# Patient Record
Sex: FEMALE | Race: WHITE | Employment: FULL TIME | URBAN - METROPOLITAN AREA
[De-identification: names, ages, dates, MRNs, and addresses within clinical notes are randomized per-mention and may not be internally consistent; named-entity substitution may affect disease eponyms.]

---

## 2019-10-14 ENCOUNTER — APPOINTMENT (OUTPATIENT)
Dept: GENERAL RADIOLOGY | Age: 54
End: 2019-10-14
Attending: EMERGENCY MEDICINE
Payer: COMMERCIAL

## 2019-10-14 ENCOUNTER — HOSPITAL ENCOUNTER (OUTPATIENT)
Age: 54
Setting detail: OBSERVATION
Discharge: HOME | End: 2019-10-17
Attending: EMERGENCY MEDICINE | Admitting: ORTHOPAEDIC SURGERY
Payer: COMMERCIAL

## 2019-10-14 ENCOUNTER — APPOINTMENT (OUTPATIENT)
Dept: GENERAL RADIOLOGY | Age: 54
End: 2019-10-14
Attending: PHYSICIAN ASSISTANT
Payer: COMMERCIAL

## 2019-10-14 DIAGNOSIS — S82.851B TYPE I OR II OPEN TRIMALLEOLAR FRACTURE OF RIGHT ANKLE, INITIAL ENCOUNTER: Primary | ICD-10-CM

## 2019-10-14 DIAGNOSIS — S82.891A FRACTURE DISLOCATION OF ANKLE JOINT, RIGHT, CLOSED, INITIAL ENCOUNTER: ICD-10-CM

## 2019-10-14 LAB
ANION GAP SERPL CALC-SCNC: 6 MMOL/L (ref 5–15)
APPEARANCE UR: ABNORMAL
BACTERIA URNS QL MICRO: NEGATIVE /HPF
BILIRUB UR QL: NEGATIVE
BUN SERPL-MCNC: 14 MG/DL (ref 6–20)
BUN/CREAT SERPL: 23 (ref 12–20)
CALCIUM SERPL-MCNC: 8.4 MG/DL (ref 8.5–10.1)
CHLORIDE SERPL-SCNC: 108 MMOL/L (ref 97–108)
CO2 SERPL-SCNC: 25 MMOL/L (ref 21–32)
COLOR UR: ABNORMAL
COMMENT, HOLDF: NORMAL
CREAT SERPL-MCNC: 0.62 MG/DL (ref 0.55–1.02)
EPITH CASTS URNS QL MICRO: ABNORMAL /LPF
ERYTHROCYTE [DISTWIDTH] IN BLOOD BY AUTOMATED COUNT: 12.6 % (ref 11.5–14.5)
GLUCOSE SERPL-MCNC: 111 MG/DL (ref 65–100)
GLUCOSE UR STRIP.AUTO-MCNC: NEGATIVE MG/DL
HCT VFR BLD AUTO: 33.7 % (ref 35–47)
HGB BLD-MCNC: 11.1 G/DL (ref 11.5–16)
HGB UR QL STRIP: ABNORMAL
HYALINE CASTS URNS QL MICRO: ABNORMAL /LPF (ref 0–5)
KETONES UR QL STRIP.AUTO: 15 MG/DL
LEUKOCYTE ESTERASE UR QL STRIP.AUTO: NEGATIVE
MCH RBC QN AUTO: 30.7 PG (ref 26–34)
MCHC RBC AUTO-ENTMCNC: 32.9 G/DL (ref 30–36.5)
MCV RBC AUTO: 93.4 FL (ref 80–99)
NITRITE UR QL STRIP.AUTO: NEGATIVE
NRBC # BLD: 0 K/UL (ref 0–0.01)
NRBC BLD-RTO: 0 PER 100 WBC
PH UR STRIP: 5.5 [PH] (ref 5–8)
PLATELET # BLD AUTO: 211 K/UL (ref 150–400)
PMV BLD AUTO: 9.3 FL (ref 8.9–12.9)
POTASSIUM SERPL-SCNC: 3.5 MMOL/L (ref 3.5–5.1)
PROT UR STRIP-MCNC: ABNORMAL MG/DL
RBC # BLD AUTO: 3.61 M/UL (ref 3.8–5.2)
RBC #/AREA URNS HPF: ABNORMAL /HPF (ref 0–5)
SAMPLES BEING HELD,HOLD: NORMAL
SODIUM SERPL-SCNC: 139 MMOL/L (ref 136–145)
SP GR UR REFRACTOMETRY: 1.02 (ref 1–1.03)
UROBILINOGEN UR QL STRIP.AUTO: 0.2 EU/DL (ref 0.2–1)
WBC # BLD AUTO: 12.1 K/UL (ref 3.6–11)
WBC URNS QL MICRO: ABNORMAL /HPF (ref 0–4)

## 2019-10-14 PROCEDURE — 85027 COMPLETE CBC AUTOMATED: CPT

## 2019-10-14 PROCEDURE — 73600 X-RAY EXAM OF ANKLE: CPT

## 2019-10-14 PROCEDURE — 74011000258 HC RX REV CODE- 258: Performed by: EMERGENCY MEDICINE

## 2019-10-14 PROCEDURE — 74011250636 HC RX REV CODE- 250/636: Performed by: EMERGENCY MEDICINE

## 2019-10-14 PROCEDURE — 93005 ELECTROCARDIOGRAM TRACING: CPT

## 2019-10-14 PROCEDURE — 96374 THER/PROPH/DIAG INJ IV PUSH: CPT

## 2019-10-14 PROCEDURE — 96375 TX/PRO/DX INJ NEW DRUG ADDON: CPT

## 2019-10-14 PROCEDURE — 74011000250 HC RX REV CODE- 250

## 2019-10-14 PROCEDURE — 96365 THER/PROPH/DIAG IV INF INIT: CPT

## 2019-10-14 PROCEDURE — 99285 EMERGENCY DEPT VISIT HI MDM: CPT

## 2019-10-14 PROCEDURE — 99152 MOD SED SAME PHYS/QHP 5/>YRS: CPT

## 2019-10-14 PROCEDURE — 74011250637 HC RX REV CODE- 250/637: Performed by: PHYSICIAN ASSISTANT

## 2019-10-14 PROCEDURE — 81001 URINALYSIS AUTO W/SCOPE: CPT

## 2019-10-14 PROCEDURE — 71045 X-RAY EXAM CHEST 1 VIEW: CPT

## 2019-10-14 PROCEDURE — 75810000301 HC ER LEVEL 1 CLOSED TREATMNT FRACTURE/DISLOCATION

## 2019-10-14 PROCEDURE — 99218 HC RM OBSERVATION: CPT

## 2019-10-14 PROCEDURE — 65270000029 HC RM PRIVATE

## 2019-10-14 PROCEDURE — 36415 COLL VENOUS BLD VENIPUNCTURE: CPT

## 2019-10-14 PROCEDURE — 80048 BASIC METABOLIC PNL TOTAL CA: CPT

## 2019-10-14 PROCEDURE — 74011250636 HC RX REV CODE- 250/636: Performed by: PHYSICIAN ASSISTANT

## 2019-10-14 RX ORDER — KETAMINE HYDROCHLORIDE 100 MG/ML
1 INJECTION, SOLUTION INTRAMUSCULAR; INTRAVENOUS
Status: DISPENSED | OUTPATIENT
Start: 2019-10-14 | End: 2019-10-15

## 2019-10-14 RX ORDER — NALOXONE HYDROCHLORIDE 0.4 MG/ML
0.4 INJECTION, SOLUTION INTRAMUSCULAR; INTRAVENOUS; SUBCUTANEOUS AS NEEDED
Status: DISCONTINUED | OUTPATIENT
Start: 2019-10-14 | End: 2019-10-17 | Stop reason: HOSPADM

## 2019-10-14 RX ORDER — KETAMINE HYDROCHLORIDE 50 MG/ML
INJECTION, SOLUTION INTRAMUSCULAR; INTRAVENOUS
Status: COMPLETED
Start: 2019-10-14 | End: 2019-10-14

## 2019-10-14 RX ORDER — ACETAMINOPHEN 325 MG/1
650 TABLET ORAL EVERY 6 HOURS
Status: DISCONTINUED | OUTPATIENT
Start: 2019-10-14 | End: 2019-10-17 | Stop reason: HOSPADM

## 2019-10-14 RX ORDER — DIPHENHYDRAMINE HYDROCHLORIDE 50 MG/ML
12.5 INJECTION, SOLUTION INTRAMUSCULAR; INTRAVENOUS
Status: DISCONTINUED | OUTPATIENT
Start: 2019-10-14 | End: 2019-10-17 | Stop reason: HOSPADM

## 2019-10-14 RX ORDER — MELATONIN
1000 DAILY
COMMUNITY
End: 2022-06-27

## 2019-10-14 RX ORDER — ONDANSETRON 4 MG/1
4 TABLET, ORALLY DISINTEGRATING ORAL
Status: DISCONTINUED | OUTPATIENT
Start: 2019-10-14 | End: 2019-10-17 | Stop reason: HOSPADM

## 2019-10-14 RX ORDER — HYDROMORPHONE HYDROCHLORIDE 1 MG/ML
2 INJECTION, SOLUTION INTRAMUSCULAR; INTRAVENOUS; SUBCUTANEOUS
Status: DISCONTINUED | OUTPATIENT
Start: 2019-10-14 | End: 2019-10-17 | Stop reason: HOSPADM

## 2019-10-14 RX ORDER — DOCUSATE SODIUM 100 MG/1
100 CAPSULE, LIQUID FILLED ORAL 2 TIMES DAILY
Status: DISCONTINUED | OUTPATIENT
Start: 2019-10-14 | End: 2019-10-17 | Stop reason: HOSPADM

## 2019-10-14 RX ORDER — ESCITALOPRAM OXALATE 10 MG/1
10 TABLET ORAL DAILY
COMMUNITY
End: 2022-06-27

## 2019-10-14 RX ORDER — HYDROMORPHONE HYDROCHLORIDE 2 MG/ML
2 INJECTION, SOLUTION INTRAMUSCULAR; INTRAVENOUS; SUBCUTANEOUS ONCE
Status: COMPLETED | OUTPATIENT
Start: 2019-10-14 | End: 2019-10-14

## 2019-10-14 RX ORDER — SODIUM CHLORIDE 0.9 % (FLUSH) 0.9 %
5-40 SYRINGE (ML) INJECTION EVERY 8 HOURS
Status: DISCONTINUED | OUTPATIENT
Start: 2019-10-14 | End: 2019-10-17 | Stop reason: HOSPADM

## 2019-10-14 RX ORDER — OXYCODONE HYDROCHLORIDE 5 MG/1
5-10 TABLET ORAL
Status: DISCONTINUED | OUTPATIENT
Start: 2019-10-14 | End: 2019-10-17 | Stop reason: HOSPADM

## 2019-10-14 RX ORDER — SODIUM CHLORIDE 0.9 % (FLUSH) 0.9 %
5-40 SYRINGE (ML) INJECTION AS NEEDED
Status: DISCONTINUED | OUTPATIENT
Start: 2019-10-14 | End: 2019-10-17 | Stop reason: HOSPADM

## 2019-10-14 RX ORDER — KETAMINE HYDROCHLORIDE 50 MG/ML
INJECTION, SOLUTION INTRAMUSCULAR; INTRAVENOUS
Status: DISPENSED
Start: 2019-10-14 | End: 2019-10-15

## 2019-10-14 RX ORDER — MORPHINE SULFATE 10 MG/ML
6 INJECTION, SOLUTION INTRAMUSCULAR; INTRAVENOUS
Status: COMPLETED | OUTPATIENT
Start: 2019-10-14 | End: 2019-10-14

## 2019-10-14 RX ORDER — LEVOTHYROXINE SODIUM 50 UG/1
50 TABLET ORAL
COMMUNITY

## 2019-10-14 RX ADMIN — HYDROMORPHONE HYDROCHLORIDE 2 MG: 2 INJECTION INTRAMUSCULAR; INTRAVENOUS; SUBCUTANEOUS at 19:41

## 2019-10-14 RX ADMIN — MORPHINE SULFATE 6 MG: 10 INJECTION INTRAVENOUS at 18:43

## 2019-10-14 RX ADMIN — CEFAZOLIN 1 G: 1 INJECTION, POWDER, FOR SOLUTION INTRAMUSCULAR; INTRAVENOUS; PARENTERAL at 20:11

## 2019-10-14 RX ADMIN — ACETAMINOPHEN 650 MG: 325 TABLET, FILM COATED ORAL at 22:05

## 2019-10-14 RX ADMIN — DOCUSATE SODIUM 100 MG: 100 CAPSULE, LIQUID FILLED ORAL at 22:04

## 2019-10-14 RX ADMIN — KETAMINE HYDROCHLORIDE 70 MG: 50 INJECTION INTRAMUSCULAR; INTRAVENOUS at 19:16

## 2019-10-14 RX ADMIN — OXYCODONE HYDROCHLORIDE 5 MG: 5 TABLET ORAL at 22:05

## 2019-10-14 NOTE — ED PROVIDER NOTES
;was walking the dog/ he took off/ I slipped/ looks like I dislocated my ankle'; 'lots of pain'; No loc/ blood thinners      pt denies HA, vison changes, diff swallowing, CP, SOB, Abd pain, F/Ch, N/V, D/Cons or other current systemic complaints    No smoking/ social etoh/ no drugs             Past Medical History:   Diagnosis Date    Hypothyroidism        History reviewed. No pertinent surgical history. History reviewed. No pertinent family history. Social History     Socioeconomic History    Marital status:      Spouse name: Not on file    Number of children: Not on file    Years of education: Not on file    Highest education level: Not on file   Occupational History    Not on file   Social Needs    Financial resource strain: Not on file    Food insecurity:     Worry: Not on file     Inability: Not on file    Transportation needs:     Medical: Not on file     Non-medical: Not on file   Tobacco Use    Smoking status: Never Smoker    Smokeless tobacco: Never Used   Substance and Sexual Activity    Alcohol use: Yes     Comment: rarely    Drug use: Not on file    Sexual activity: Not on file   Lifestyle    Physical activity:     Days per week: Not on file     Minutes per session: Not on file    Stress: Not on file   Relationships    Social connections:     Talks on phone: Not on file     Gets together: Not on file     Attends Rastafarian service: Not on file     Active member of club or organization: Not on file     Attends meetings of clubs or organizations: Not on file     Relationship status: Not on file    Intimate partner violence:     Fear of current or ex partner: Not on file     Emotionally abused: Not on file     Physically abused: Not on file     Forced sexual activity: Not on file   Other Topics Concern    Not on file   Social History Narrative    Not on file         ALLERGIES: Patient has no known allergies.     Review of Systems   Musculoskeletal: Positive for arthralgias, gait problem, joint swelling and myalgias. All other systems reviewed and are negative. Vitals:    10/15/19 2046 10/15/19 2134 10/15/19 2226 10/15/19 2329   BP: 110/67 105/61 107/65 113/59   Pulse: 94 93 93 89   Resp: 14 14 14 14   Temp: 99.2 °F (37.3 °C) 97.6 °F (36.4 °C) 97.9 °F (36.6 °C) 98.4 °F (36.9 °C)   SpO2: 98% 95% 96% 95%   Weight:       Height:                Physical Exam   Constitutional: She is oriented to person, place, and time. She appears well-developed and well-nourished. Uncomfortable appearing, AxOx4, speaking in complete sentences    R ankle noted deformity/ in a pillow splint; pt has distal motor/ CV/ Sensation grossly intact / foot is cyanotic;    HENT:   Head: Normocephalic and atraumatic. Right Ear: External ear normal.   Left Ear: External ear normal.   Nose: Nose normal.   Mouth/Throat: Oropharynx is clear and moist.   Eyes: Pupils are equal, round, and reactive to light. Conjunctivae and EOM are normal. Right eye exhibits no discharge. Left eye exhibits no discharge. No scleral icterus. Neck: Normal range of motion. Neck supple. No JVD present. No tracheal deviation present. Cardiovascular: Normal rate, regular rhythm, normal heart sounds and intact distal pulses. Exam reveals no gallop and no friction rub. No murmur heard. Pulmonary/Chest: Effort normal and breath sounds normal. No respiratory distress. She has no wheezes. She has no rales. She exhibits no tenderness. Abdominal: Soft. Bowel sounds are normal. There is no tenderness. There is no rebound and no guarding. nttp   Genitourinary: No vaginal discharge found. Musculoskeletal: She exhibits tenderness and deformity. She exhibits no edema. Deformity as pictured/ ? Open fracture/ pictured in tiger text to orthopaedist/ / pt has distal motor/ CV/ Sensation grossly intact    Neurological: She is alert and oriented to person, place, and time. She displays normal reflexes.  No cranial nerve deficit or sensory deficit. She exhibits normal muscle tone. Coordination normal.   Skin: Skin is warm and dry. Capillary refill takes 2 to 3 seconds. No rash noted. No erythema. No pallor. Psychiatric: She has a normal mood and affect. Her behavior is normal. Thought content normal.   Nursing note and vitals reviewed. MDM       Procedural Sedation  Date/Time: 10/14/2019 7:37 PM  Performed by: Aron Nelson MD  Authorized by: Aron Nelson MD     Consent:     Consent obtained:  Verbal and written    Consent given by:  Patient    Risks discussed:   Allergic reaction, dysrhythmia, inadequate sedation, nausea, respiratory compromise necessitating ventilatory assistance and intubation, prolonged sedation necessitating reversal, prolonged hypoxia resulting in organ damage and vomiting    Alternatives discussed:  Analgesia without sedation  Indications:     Procedure performed:  Fracture reduction    Procedure necessitating sedation performed by:  Physician performing sedation    Intended level of sedation:  Moderate (conscious sedation)  Pre-sedation assessment:     Time since last food or drink:  6 hours    ASA classification: class 1 - normal, healthy patient      Neck mobility: normal      Mouth opening:  3 or more finger widths    Thyromental distance:  4 finger widths    Mallampati score:  II - soft palate, uvula, fauces visible    Pre-sedation assessments completed and reviewed: airway patency, cardiovascular function, hydration status, mental status, nausea/vomiting, pain level, respiratory function and temperature      History of difficult intubation: no      Pre-sedation assessment completed:  10/14/2019 6:38 PM  Immediate pre-procedure details:     Reassessment: Patient reassessed immediately prior to procedure      Reviewed: vital signs, relevant labs/tests and NPO status      Verified: bag valve mask available, emergency equipment available, intubation equipment available, IV patency confirmed, oxygen available and suction available    Procedure details (see MAR for exact dosages):     Sedation start time:  10/14/2019 7:20 PM    Preoxygenation:  Nasal cannula    Sedation:  Ketamine    Analgesia:  Morphine    Intra-procedure monitoring:  Blood pressure monitoring, cardiac monitor, continuous pulse oximetry, continuous capnometry, frequent LOC assessments and frequent vital sign checks    Intra-procedure events: none      Sedation end time:  10/14/2019 7:35 PM    Total sedation time (minutes):  15  Post-procedure details:     Post-sedation assessment completed:  10/14/2019 7:39 PM    Attendance: Constant attendance by certified staff until patient recovered      Recovery: Patient returned to pre-procedure baseline      Estimated blood loss (see I/O flowsheets): yes      Post-sedation assessments completed and reviewed: airway patency, cardiovascular function, hydration status, mental status, nausea/vomiting, pain level, respiratory function and temperature      Specimens recovered:  None    Patient is stable for discharge or admission: yes      Patient tolerance: Tolerated well, no immediate complications        CONSULT  NOTE  6:43 PM  Aimee Page MD spoke via tigertext  with Dr Romero/ Betty Hdz. Specialty: Orthopaedics  Discussed pt's hx, disposition, and available diagnostic and imaging results. Reviewed care plans. Consulting physician agrees with plans as outlined 'Will see shortly';  .    Written by Caridad Mccarty MD, ED Scribe as dictated by Nanda Dale MD.    9 PM   Ortho will admit

## 2019-10-14 NOTE — ED TRIAGE NOTES
Triage: Patient arrives via EMS with c/o a GLF that occurred this evening while walking her dog. Patient states she heard something \"pop\" in her right ankle. Patient denies hitting her head. Patient was given 100 mcg of fentanyl on route.

## 2019-10-15 ENCOUNTER — ANESTHESIA EVENT (OUTPATIENT)
Dept: SURGERY | Age: 54
End: 2019-10-15
Payer: COMMERCIAL

## 2019-10-15 ENCOUNTER — ANESTHESIA (OUTPATIENT)
Dept: SURGERY | Age: 54
End: 2019-10-15
Payer: COMMERCIAL

## 2019-10-15 ENCOUNTER — APPOINTMENT (OUTPATIENT)
Dept: GENERAL RADIOLOGY | Age: 54
End: 2019-10-15
Attending: ORTHOPAEDIC SURGERY
Payer: COMMERCIAL

## 2019-10-15 LAB
ATRIAL RATE: 92 BPM
CALCULATED P AXIS, ECG09: 64 DEGREES
CALCULATED R AXIS, ECG10: 70 DEGREES
CALCULATED T AXIS, ECG11: 36 DEGREES
DIAGNOSIS, 93000: NORMAL
P-R INTERVAL, ECG05: 130 MS
Q-T INTERVAL, ECG07: 362 MS
QRS DURATION, ECG06: 86 MS
QTC CALCULATION (BEZET), ECG08: 447 MS
VENTRICULAR RATE, ECG03: 92 BPM

## 2019-10-15 PROCEDURE — 74011000250 HC RX REV CODE- 250: Performed by: NURSE ANESTHETIST, CERTIFIED REGISTERED

## 2019-10-15 PROCEDURE — C1713 ANCHOR/SCREW BN/BN,TIS/BN: HCPCS | Performed by: ORTHOPAEDIC SURGERY

## 2019-10-15 PROCEDURE — 99218 HC RM OBSERVATION: CPT

## 2019-10-15 PROCEDURE — 74011250636 HC RX REV CODE- 250/636: Performed by: NURSE ANESTHETIST, CERTIFIED REGISTERED

## 2019-10-15 PROCEDURE — 74011250636 HC RX REV CODE- 250/636: Performed by: PHYSICIAN ASSISTANT

## 2019-10-15 PROCEDURE — 74011250637 HC RX REV CODE- 250/637: Performed by: PHYSICIAN ASSISTANT

## 2019-10-15 PROCEDURE — 76010000153 HC OR TIME 1.5 TO 2 HR: Performed by: ORTHOPAEDIC SURGERY

## 2019-10-15 PROCEDURE — 65270000029 HC RM PRIVATE

## 2019-10-15 PROCEDURE — 74011250636 HC RX REV CODE- 250/636: Performed by: ORTHOPAEDIC SURGERY

## 2019-10-15 PROCEDURE — 73610 X-RAY EXAM OF ANKLE: CPT

## 2019-10-15 PROCEDURE — 74011000258 HC RX REV CODE- 258: Performed by: PHYSICIAN ASSISTANT

## 2019-10-15 PROCEDURE — 77030000032 HC CUF TRNQT ZIMM -B: Performed by: ORTHOPAEDIC SURGERY

## 2019-10-15 PROCEDURE — 77030039497 HC CST PAD STERILE CHCS -A: Performed by: ORTHOPAEDIC SURGERY

## 2019-10-15 PROCEDURE — 77030031139 HC SUT VCRL2 J&J -A: Performed by: ORTHOPAEDIC SURGERY

## 2019-10-15 PROCEDURE — 74011250636 HC RX REV CODE- 250/636

## 2019-10-15 PROCEDURE — 76210000017 HC OR PH I REC 1.5 TO 2 HR: Performed by: ORTHOPAEDIC SURGERY

## 2019-10-15 PROCEDURE — 96376 TX/PRO/DX INJ SAME DRUG ADON: CPT

## 2019-10-15 PROCEDURE — 77030003862 HC BIT DRL SYNT -B: Performed by: ORTHOPAEDIC SURGERY

## 2019-10-15 PROCEDURE — 76060000034 HC ANESTHESIA 1.5 TO 2 HR: Performed by: ORTHOPAEDIC SURGERY

## 2019-10-15 PROCEDURE — 77030018836 HC SOL IRR NACL ICUM -A: Performed by: ORTHOPAEDIC SURGERY

## 2019-10-15 PROCEDURE — 74011250636 HC RX REV CODE- 250/636: Performed by: ANESTHESIOLOGY

## 2019-10-15 PROCEDURE — 77030002916 HC SUT ETHLN J&J -A: Performed by: ORTHOPAEDIC SURGERY

## 2019-10-15 PROCEDURE — 77030011640 HC PAD GRND REM COVD -A: Performed by: ORTHOPAEDIC SURGERY

## 2019-10-15 DEVICE — IMPLANTABLE DEVICE: Type: IMPLANTABLE DEVICE | Site: ANKLE | Status: FUNCTIONAL

## 2019-10-15 DEVICE — SCREW BNE L20MM DIA3.5MM CORT S STL ST NONCANNULATED LOK: Type: IMPLANTABLE DEVICE | Site: ANKLE | Status: FUNCTIONAL

## 2019-10-15 DEVICE — 3.5MM CORTEX SCREW SELF-TAPPING 14MM: Type: IMPLANTABLE DEVICE | Site: ANKLE | Status: FUNCTIONAL

## 2019-10-15 DEVICE — SCREW BNE L38MM DIA3.5MM CORT S STL ST LOK FULL THRD: Type: IMPLANTABLE DEVICE | Site: ANKLE | Status: FUNCTIONAL

## 2019-10-15 DEVICE — PLATE BNE L93MM 8 H S STL 1/3 TBLR LOK COMPR W/ CLLR FOR: Type: IMPLANTABLE DEVICE | Site: ANKLE | Status: FUNCTIONAL

## 2019-10-15 DEVICE — SCREW BNE L16MM DIA4MM CANC S STL ST CANN NONLOCKING FULL: Type: IMPLANTABLE DEVICE | Site: ANKLE | Status: FUNCTIONAL

## 2019-10-15 DEVICE — 3.5MM CORTEX SCREW SELF-TAPPING 12MM: Type: IMPLANTABLE DEVICE | Site: ANKLE | Status: FUNCTIONAL

## 2019-10-15 DEVICE — ISOLA SPINE SYSTEM ROD BENDERS (TUBULAR) SET
Type: IMPLANTABLE DEVICE | Site: ANKLE | Status: FUNCTIONAL
Brand: ISOLA

## 2019-10-15 RX ORDER — ROPIVACAINE HYDROCHLORIDE 5 MG/ML
150 INJECTION, SOLUTION EPIDURAL; INFILTRATION; PERINEURAL AS NEEDED
Status: DISCONTINUED | OUTPATIENT
Start: 2019-10-15 | End: 2019-10-15 | Stop reason: HOSPADM

## 2019-10-15 RX ORDER — NALOXONE HYDROCHLORIDE 0.4 MG/ML
0.4 INJECTION, SOLUTION INTRAMUSCULAR; INTRAVENOUS; SUBCUTANEOUS AS NEEDED
Status: DISCONTINUED | OUTPATIENT
Start: 2019-10-15 | End: 2019-10-17 | Stop reason: HOSPADM

## 2019-10-15 RX ORDER — PHENYLEPHRINE HCL IN 0.9% NACL 0.4MG/10ML
SYRINGE (ML) INTRAVENOUS AS NEEDED
Status: DISCONTINUED | OUTPATIENT
Start: 2019-10-15 | End: 2019-10-15 | Stop reason: HOSPADM

## 2019-10-15 RX ORDER — MIDAZOLAM HYDROCHLORIDE 1 MG/ML
1 INJECTION, SOLUTION INTRAMUSCULAR; INTRAVENOUS AS NEEDED
Status: DISCONTINUED | OUTPATIENT
Start: 2019-10-15 | End: 2019-10-15 | Stop reason: HOSPADM

## 2019-10-15 RX ORDER — FENTANYL CITRATE 50 UG/ML
25 INJECTION, SOLUTION INTRAMUSCULAR; INTRAVENOUS
Status: DISCONTINUED | OUTPATIENT
Start: 2019-10-15 | End: 2019-10-15 | Stop reason: HOSPADM

## 2019-10-15 RX ORDER — ACETAMINOPHEN 325 MG/1
650 TABLET ORAL
Status: DISCONTINUED | OUTPATIENT
Start: 2019-10-15 | End: 2019-10-17 | Stop reason: HOSPADM

## 2019-10-15 RX ORDER — HYDROMORPHONE HYDROCHLORIDE 1 MG/ML
0.2 INJECTION, SOLUTION INTRAMUSCULAR; INTRAVENOUS; SUBCUTANEOUS
Status: COMPLETED | OUTPATIENT
Start: 2019-10-15 | End: 2019-10-15

## 2019-10-15 RX ORDER — SODIUM CHLORIDE, SODIUM LACTATE, POTASSIUM CHLORIDE, CALCIUM CHLORIDE 600; 310; 30; 20 MG/100ML; MG/100ML; MG/100ML; MG/100ML
1000 INJECTION, SOLUTION INTRAVENOUS CONTINUOUS
Status: DISCONTINUED | OUTPATIENT
Start: 2019-10-15 | End: 2019-10-15 | Stop reason: HOSPADM

## 2019-10-15 RX ORDER — MORPHINE SULFATE 10 MG/ML
2 INJECTION, SOLUTION INTRAMUSCULAR; INTRAVENOUS
Status: DISCONTINUED | OUTPATIENT
Start: 2019-10-15 | End: 2019-10-15 | Stop reason: HOSPADM

## 2019-10-15 RX ORDER — SODIUM CHLORIDE, SODIUM LACTATE, POTASSIUM CHLORIDE, CALCIUM CHLORIDE 600; 310; 30; 20 MG/100ML; MG/100ML; MG/100ML; MG/100ML
100 INJECTION, SOLUTION INTRAVENOUS CONTINUOUS
Status: DISCONTINUED | OUTPATIENT
Start: 2019-10-15 | End: 2019-10-15 | Stop reason: HOSPADM

## 2019-10-15 RX ORDER — LIDOCAINE HYDROCHLORIDE 20 MG/ML
INJECTION, SOLUTION EPIDURAL; INFILTRATION; INTRACAUDAL; PERINEURAL AS NEEDED
Status: DISCONTINUED | OUTPATIENT
Start: 2019-10-15 | End: 2019-10-15 | Stop reason: HOSPADM

## 2019-10-15 RX ORDER — CEFAZOLIN SODIUM/WATER 2 G/20 ML
2 SYRINGE (ML) INTRAVENOUS ONCE
Status: COMPLETED | OUTPATIENT
Start: 2019-10-15 | End: 2019-10-15

## 2019-10-15 RX ORDER — CEFAZOLIN SODIUM/WATER 2 G/20 ML
2 SYRINGE (ML) INTRAVENOUS EVERY 8 HOURS
Status: COMPLETED | OUTPATIENT
Start: 2019-10-16 | End: 2019-10-16

## 2019-10-15 RX ORDER — MIDAZOLAM HYDROCHLORIDE 1 MG/ML
INJECTION, SOLUTION INTRAMUSCULAR; INTRAVENOUS AS NEEDED
Status: DISCONTINUED | OUTPATIENT
Start: 2019-10-15 | End: 2019-10-15 | Stop reason: HOSPADM

## 2019-10-15 RX ORDER — KETAMINE HYDROCHLORIDE 10 MG/ML
INJECTION, SOLUTION INTRAMUSCULAR; INTRAVENOUS AS NEEDED
Status: DISCONTINUED | OUTPATIENT
Start: 2019-10-15 | End: 2019-10-15 | Stop reason: HOSPADM

## 2019-10-15 RX ORDER — HYDROMORPHONE HYDROCHLORIDE 1 MG/ML
INJECTION, SOLUTION INTRAMUSCULAR; INTRAVENOUS; SUBCUTANEOUS
Status: COMPLETED
Start: 2019-10-15 | End: 2019-10-15

## 2019-10-15 RX ORDER — OXYCODONE AND ACETAMINOPHEN 5; 325 MG/1; MG/1
1 TABLET ORAL
Status: DISCONTINUED | OUTPATIENT
Start: 2019-10-15 | End: 2019-10-17 | Stop reason: HOSPADM

## 2019-10-15 RX ORDER — DEXAMETHASONE SODIUM PHOSPHATE 4 MG/ML
INJECTION, SOLUTION INTRA-ARTICULAR; INTRALESIONAL; INTRAMUSCULAR; INTRAVENOUS; SOFT TISSUE AS NEEDED
Status: DISCONTINUED | OUTPATIENT
Start: 2019-10-15 | End: 2019-10-15 | Stop reason: HOSPADM

## 2019-10-15 RX ORDER — ONDANSETRON 2 MG/ML
INJECTION INTRAMUSCULAR; INTRAVENOUS AS NEEDED
Status: DISCONTINUED | OUTPATIENT
Start: 2019-10-15 | End: 2019-10-15 | Stop reason: HOSPADM

## 2019-10-15 RX ORDER — FENTANYL CITRATE 50 UG/ML
INJECTION, SOLUTION INTRAMUSCULAR; INTRAVENOUS AS NEEDED
Status: DISCONTINUED | OUTPATIENT
Start: 2019-10-15 | End: 2019-10-15 | Stop reason: HOSPADM

## 2019-10-15 RX ORDER — SODIUM CHLORIDE 0.9 % (FLUSH) 0.9 %
5-40 SYRINGE (ML) INJECTION EVERY 8 HOURS
Status: DISCONTINUED | OUTPATIENT
Start: 2019-10-15 | End: 2019-10-17 | Stop reason: HOSPADM

## 2019-10-15 RX ORDER — SODIUM CHLORIDE 0.9 % (FLUSH) 0.9 %
5-40 SYRINGE (ML) INJECTION AS NEEDED
Status: DISCONTINUED | OUTPATIENT
Start: 2019-10-15 | End: 2019-10-17 | Stop reason: HOSPADM

## 2019-10-15 RX ORDER — EPHEDRINE SULFATE/0.9% NACL/PF 50 MG/5 ML
5 SYRINGE (ML) INTRAVENOUS AS NEEDED
Status: DISCONTINUED | OUTPATIENT
Start: 2019-10-15 | End: 2019-10-15 | Stop reason: HOSPADM

## 2019-10-15 RX ORDER — ONDANSETRON 2 MG/ML
4 INJECTION INTRAMUSCULAR; INTRAVENOUS AS NEEDED
Status: DISCONTINUED | OUTPATIENT
Start: 2019-10-15 | End: 2019-10-15 | Stop reason: HOSPADM

## 2019-10-15 RX ORDER — FENTANYL CITRATE 50 UG/ML
50 INJECTION, SOLUTION INTRAMUSCULAR; INTRAVENOUS AS NEEDED
Status: DISCONTINUED | OUTPATIENT
Start: 2019-10-15 | End: 2019-10-15 | Stop reason: HOSPADM

## 2019-10-15 RX ORDER — SODIUM CHLORIDE 9 MG/ML
25 INJECTION, SOLUTION INTRAVENOUS CONTINUOUS
Status: DISCONTINUED | OUTPATIENT
Start: 2019-10-15 | End: 2019-10-15 | Stop reason: HOSPADM

## 2019-10-15 RX ORDER — LIDOCAINE HYDROCHLORIDE 10 MG/ML
0.1 INJECTION, SOLUTION EPIDURAL; INFILTRATION; INTRACAUDAL; PERINEURAL AS NEEDED
Status: DISCONTINUED | OUTPATIENT
Start: 2019-10-15 | End: 2019-10-15 | Stop reason: HOSPADM

## 2019-10-15 RX ORDER — ACETAMINOPHEN 325 MG/1
650 TABLET ORAL ONCE
Status: DISCONTINUED | OUTPATIENT
Start: 2019-10-15 | End: 2019-10-15 | Stop reason: HOSPADM

## 2019-10-15 RX ORDER — HYDROMORPHONE HYDROCHLORIDE 2 MG/ML
INJECTION, SOLUTION INTRAMUSCULAR; INTRAVENOUS; SUBCUTANEOUS AS NEEDED
Status: DISCONTINUED | OUTPATIENT
Start: 2019-10-15 | End: 2019-10-15 | Stop reason: HOSPADM

## 2019-10-15 RX ORDER — ASPIRIN 325 MG
325 TABLET ORAL 2 TIMES DAILY
Status: DISCONTINUED | OUTPATIENT
Start: 2019-10-16 | End: 2019-10-17 | Stop reason: HOSPADM

## 2019-10-15 RX ORDER — DIPHENHYDRAMINE HYDROCHLORIDE 50 MG/ML
12.5 INJECTION, SOLUTION INTRAMUSCULAR; INTRAVENOUS AS NEEDED
Status: DISCONTINUED | OUTPATIENT
Start: 2019-10-15 | End: 2019-10-15 | Stop reason: HOSPADM

## 2019-10-15 RX ORDER — HYDROMORPHONE HYDROCHLORIDE 1 MG/ML
1 INJECTION, SOLUTION INTRAMUSCULAR; INTRAVENOUS; SUBCUTANEOUS
Status: DISCONTINUED | OUTPATIENT
Start: 2019-10-15 | End: 2019-10-17 | Stop reason: HOSPADM

## 2019-10-15 RX ORDER — MIDAZOLAM HYDROCHLORIDE 1 MG/ML
0.5 INJECTION, SOLUTION INTRAMUSCULAR; INTRAVENOUS
Status: DISCONTINUED | OUTPATIENT
Start: 2019-10-15 | End: 2019-10-15 | Stop reason: HOSPADM

## 2019-10-15 RX ORDER — OXYCODONE HYDROCHLORIDE 5 MG/1
5 TABLET ORAL AS NEEDED
Status: DISCONTINUED | OUTPATIENT
Start: 2019-10-15 | End: 2019-10-15 | Stop reason: HOSPADM

## 2019-10-15 RX ORDER — PROPOFOL 10 MG/ML
INJECTION, EMULSION INTRAVENOUS AS NEEDED
Status: DISCONTINUED | OUTPATIENT
Start: 2019-10-15 | End: 2019-10-15 | Stop reason: HOSPADM

## 2019-10-15 RX ORDER — DEXTROSE MONOHYDRATE AND SODIUM CHLORIDE 5; .45 G/100ML; G/100ML
100 INJECTION, SOLUTION INTRAVENOUS CONTINUOUS
Status: DISCONTINUED | OUTPATIENT
Start: 2019-10-15 | End: 2019-10-17 | Stop reason: HOSPADM

## 2019-10-15 RX ORDER — SODIUM CHLORIDE, SODIUM LACTATE, POTASSIUM CHLORIDE, CALCIUM CHLORIDE 600; 310; 30; 20 MG/100ML; MG/100ML; MG/100ML; MG/100ML
INJECTION, SOLUTION INTRAVENOUS
Status: DISCONTINUED | OUTPATIENT
Start: 2019-10-15 | End: 2019-10-15 | Stop reason: HOSPADM

## 2019-10-15 RX ADMIN — DEXTROSE MONOHYDRATE AND SODIUM CHLORIDE 100 ML/HR: 5; .45 INJECTION, SOLUTION INTRAVENOUS at 11:27

## 2019-10-15 RX ADMIN — ACETAMINOPHEN 650 MG: 325 TABLET, FILM COATED ORAL at 07:23

## 2019-10-15 RX ADMIN — PROPOFOL 150 MG: 10 INJECTION, EMULSION INTRAVENOUS at 17:19

## 2019-10-15 RX ADMIN — DEXAMETHASONE SODIUM PHOSPHATE 8 MG: 4 INJECTION, SOLUTION INTRAMUSCULAR; INTRAVENOUS at 17:30

## 2019-10-15 RX ADMIN — HYDROMORPHONE HYDROCHLORIDE 0.5 MG: 2 INJECTION, SOLUTION INTRAMUSCULAR; INTRAVENOUS; SUBCUTANEOUS at 17:40

## 2019-10-15 RX ADMIN — Medication 60 MCG: at 17:43

## 2019-10-15 RX ADMIN — MORPHINE SULFATE 2 MG: 10 INJECTION INTRAVENOUS at 19:58

## 2019-10-15 RX ADMIN — MIDAZOLAM 2 MG: 1 INJECTION INTRAMUSCULAR; INTRAVENOUS at 17:13

## 2019-10-15 RX ADMIN — ONDANSETRON 4 MG: 4 TABLET, ORALLY DISINTEGRATING ORAL at 00:37

## 2019-10-15 RX ADMIN — MORPHINE SULFATE 2 MG: 10 INJECTION INTRAVENOUS at 19:50

## 2019-10-15 RX ADMIN — OXYCODONE HYDROCHLORIDE 10 MG: 5 TABLET ORAL at 15:39

## 2019-10-15 RX ADMIN — LIDOCAINE HYDROCHLORIDE 80 MG: 20 INJECTION, SOLUTION EPIDURAL; INFILTRATION; INTRACAUDAL; PERINEURAL at 17:19

## 2019-10-15 RX ADMIN — OXYCODONE HYDROCHLORIDE 10 MG: 5 TABLET ORAL at 11:16

## 2019-10-15 RX ADMIN — HYDROMORPHONE HYDROCHLORIDE 0.2 MG: 1 INJECTION, SOLUTION INTRAMUSCULAR; INTRAVENOUS; SUBCUTANEOUS at 19:22

## 2019-10-15 RX ADMIN — HYDROMORPHONE HYDROCHLORIDE 2 MG: 1 INJECTION, SOLUTION INTRAMUSCULAR; INTRAVENOUS; SUBCUTANEOUS at 12:25

## 2019-10-15 RX ADMIN — HYDROMORPHONE HYDROCHLORIDE 1 MG: 1 INJECTION, SOLUTION INTRAMUSCULAR; INTRAVENOUS; SUBCUTANEOUS at 23:34

## 2019-10-15 RX ADMIN — ONDANSETRON HYDROCHLORIDE 4 MG: 2 INJECTION, SOLUTION INTRAMUSCULAR; INTRAVENOUS at 17:30

## 2019-10-15 RX ADMIN — HYDROMORPHONE HYDROCHLORIDE 0.2 MG: 1 INJECTION, SOLUTION INTRAMUSCULAR; INTRAVENOUS; SUBCUTANEOUS at 19:05

## 2019-10-15 RX ADMIN — Medication 2 G: at 17:28

## 2019-10-15 RX ADMIN — HYDROMORPHONE HYDROCHLORIDE 2 MG: 1 INJECTION, SOLUTION INTRAMUSCULAR; INTRAVENOUS; SUBCUTANEOUS at 05:02

## 2019-10-15 RX ADMIN — SODIUM CHLORIDE, POTASSIUM CHLORIDE, SODIUM LACTATE AND CALCIUM CHLORIDE: 600; 310; 30; 20 INJECTION, SOLUTION INTRAVENOUS at 17:13

## 2019-10-15 RX ADMIN — ACETAMINOPHEN 650 MG: 325 TABLET, FILM COATED ORAL at 20:59

## 2019-10-15 RX ADMIN — SODIUM CHLORIDE, SODIUM LACTATE, POTASSIUM CHLORIDE, AND CALCIUM CHLORIDE 1000 ML: 600; 310; 30; 20 INJECTION, SOLUTION INTRAVENOUS at 16:55

## 2019-10-15 RX ADMIN — KETAMINE HYDROCHLORIDE 20 MG: 10 INJECTION, SOLUTION INTRAMUSCULAR; INTRAVENOUS at 17:24

## 2019-10-15 RX ADMIN — HYDROMORPHONE HYDROCHLORIDE 0.2 MG: 1 INJECTION, SOLUTION INTRAMUSCULAR; INTRAVENOUS; SUBCUTANEOUS at 19:33

## 2019-10-15 RX ADMIN — HYDROMORPHONE HYDROCHLORIDE 2 MG: 1 INJECTION, SOLUTION INTRAMUSCULAR; INTRAVENOUS; SUBCUTANEOUS at 00:35

## 2019-10-15 RX ADMIN — ONDANSETRON 4 MG: 4 TABLET, ORALLY DISINTEGRATING ORAL at 07:53

## 2019-10-15 RX ADMIN — HYDROMORPHONE HYDROCHLORIDE 0.2 MG: 1 INJECTION, SOLUTION INTRAMUSCULAR; INTRAVENOUS; SUBCUTANEOUS at 19:43

## 2019-10-15 RX ADMIN — FENTANYL CITRATE 50 MCG: 50 INJECTION, SOLUTION INTRAMUSCULAR; INTRAVENOUS at 18:57

## 2019-10-15 RX ADMIN — HYDROMORPHONE HYDROCHLORIDE 2 MG: 1 INJECTION, SOLUTION INTRAMUSCULAR; INTRAVENOUS; SUBCUTANEOUS at 08:46

## 2019-10-15 RX ADMIN — ACETAMINOPHEN 650 MG: 325 TABLET, FILM COATED ORAL at 15:39

## 2019-10-15 RX ADMIN — HYDROMORPHONE HYDROCHLORIDE 0.2 MG: 1 INJECTION, SOLUTION INTRAMUSCULAR; INTRAVENOUS; SUBCUTANEOUS at 19:11

## 2019-10-15 RX ADMIN — Medication 10 ML: at 05:04

## 2019-10-15 RX ADMIN — OXYCODONE HYDROCHLORIDE 10 MG: 5 TABLET ORAL at 07:20

## 2019-10-15 RX ADMIN — DOCUSATE SODIUM 100 MG: 100 CAPSULE, LIQUID FILLED ORAL at 08:46

## 2019-10-15 RX ADMIN — OXYCODONE HYDROCHLORIDE 5 MG: 5 TABLET ORAL at 03:10

## 2019-10-15 RX ADMIN — Medication 5 ML: at 23:34

## 2019-10-15 RX ADMIN — DEXTROSE MONOHYDRATE AND SODIUM CHLORIDE 100 ML/HR: 5; .45 INJECTION, SOLUTION INTRAVENOUS at 19:55

## 2019-10-15 NOTE — PROGRESS NOTES
Admission Medication Reconciliation:    Information obtained from:  Patient  RxQuery data available¹:  NO-not at this time    Comments/Recommendations: Updated PTA meds/reviewed patient's allergies. Patient provided history. Medication changes (never reviewed): Added all agents--she is not sure of estrogen dose but will let us know tomorrow (son can provide). Also receives hormone injections. Thank you for allowing me to participate in the care of your patient. Ccoo Hairston PharmD, RN #[id number] [facility] benefit data reflects medications filled and processed through the patient's insurance, however   this data does NOT capture whether the medication was picked up or is currently being taken by the patient. Allergies:  Patient has no known allergies. Significant PMH/Disease States:   Past Medical History:   Diagnosis Date    Hypothyroidism      Chief Complaint for this Admission:    Chief Complaint   Patient presents with    Fall     Prior to Admission Medications:   Prior to Admission Medications   Prescriptions Last Dose Informant Patient Reported? Taking? cholecalciferol (VITAMIN D3) (1000 Units /25 mcg) tablet 10/14/2019 at Unknown time  Yes Yes   Sig: Take 1,000 Units by mouth daily. escitalopram oxalate (LEXAPRO) 10 mg tablet 10/14/2019 at Unknown time  Yes Yes   Sig: Take 10 mg by mouth daily. estrogens, conjugated (CONJUGATED ESTROGENS PO) 10/14/2019 at Unknown time  Yes Yes   Sig: Take  by mouth daily. levothyroxine (SYNTHROID) 50 mcg tablet 10/14/2019 at Unknown time  Yes Yes   Sig: Take 50 mcg by mouth Daily (before breakfast). Facility-Administered Medications: None       Please contact the main inpatient pharmacy with any questions or concerns at [phone] and we will direct you to the clinical pharmacist covering this patient's care while in-house.    JASEN Heredia

## 2019-10-15 NOTE — PROGRESS NOTES
TRANSFER - OUT REPORT:    Verbal report given to ACRLOTTA Love(name) on Camron Negrete  being transferred to [facility](unit) for ordered procedure       Report consisted of patients Situation, Background, Assessment and   Recommendations(SBAR). Information from the following report(s) Intake/Output, MAR and Accordion was reviewed with the receiving nurse. Lines:   Peripheral IV 10/14/19 Right Antecubital (Active)   Site Assessment Clean, dry, & intact 10/15/2019  8:47 AM   Phlebitis Assessment 0 10/15/2019  8:47 AM   Infiltration Assessment 0 10/15/2019  8:47 AM   Dressing Status Clean, dry, & intact 10/15/2019  8:47 AM   Dressing Type Transparent 10/15/2019  8:47 AM   Hub Color/Line Status Capped 10/15/2019  8:47 AM   Action Taken Open ports on tubing capped 10/15/2019  8:47 AM   Alcohol Cap Used Yes 10/15/2019  8:47 AM        Opportunity for questions and clarification was provided.       Patient transported with:

## 2019-10-15 NOTE — PROGRESS NOTES
Bedside and Verbal shift change report given to Deanne Wahl RN (oncoming nurse) by Ger Masterson RN (offgoing nurse). Report included the following information SBAR, Kardex, Intake/Output, MAR and Recent Results.

## 2019-10-15 NOTE — ANESTHESIA PREPROCEDURE EVALUATION
Relevant Problems   No relevant active problems       Anesthetic History   No history of anesthetic complications            Review of Systems / Medical History  Patient summary reviewed, nursing notes reviewed and pertinent labs reviewed    Pulmonary  Within defined limits                 Neuro/Psych   Within defined limits           Cardiovascular  Within defined limits                     GI/Hepatic/Renal  Within defined limits              Endo/Other      Hypothyroidism       Other Findings              Physical Exam    Airway  Mallampati: II  TM Distance: > 6 cm  Neck ROM: normal range of motion   Mouth opening: Normal     Cardiovascular  Regular rate and rhythm,  S1 and S2 normal,  no murmur, click, rub, or gallop             Dental  No notable dental hx       Pulmonary  Breath sounds clear to auscultation               Abdominal  GI exam deferred       Other Findings            Anesthetic Plan    ASA: 2, emergent  Anesthesia type: general          Induction: Intravenous  Anesthetic plan and risks discussed with: Patient

## 2019-10-15 NOTE — PROGRESS NOTES
TRANSFER - IN REPORT:    Verbal report received from [facility]) on Mindy Colder  being received from [facility](unit) for ordered procedure      Report consisted of patients Situation, Background, Assessment and   Recommendations(SBAR). Information from the following report(s) SBAR, Kardex, MAR, Recent Results and Pre Procedure Checklist was reviewed with the receiving nurse. Opportunity for questions and clarification was provided. Assessment completed upon patients arrival to unit and care assumed.

## 2019-10-15 NOTE — PROGRESS NOTES
Primary Nurse Cora Nolasco and Lee Gomez RN performed a dual skin assessment on this patient No impairment noted  Sarwat score is 20

## 2019-10-15 NOTE — ROUTINE PROCESS
TRANSFER - OUT REPORT: 
 
Verbal report given to Mary Ellen(name) on Kami Cifuentes  being transferred to (unit) for routine progression of care Report consisted of patients Situation, Background, Assessment and  
Recommendations(SBAR). Information from the following report(s) SBAR, ED Summary, [facility] and Recent Results was reviewed with the receiving nurse. Lines:  
Peripheral IV 10/14/19 Right Antecubital (Active) Site Assessment Clean, dry, & intact 10/14/2019  6:35 PM  
Phlebitis Assessment 0 10/14/2019  6:35 PM  
Infiltration Assessment 0 10/14/2019  6:35 PM  
Dressing Status Clean, dry, & intact 10/14/2019  6:35 PM  
  
 
Opportunity for questions and clarification was provided. Patient transported with: 
 O2 @ 2 liters Tech

## 2019-10-15 NOTE — PROGRESS NOTES
ORTHO PROGRESS NOTE      SUBJECTIVE:  Wanda Chase states her ankle pain is controlled with IV Dilaudid. Some nausea controlled with Zofran. No vomiting. No CP/SOB. She is thirsty. Lives alone but son from [place] [id number] arriving tonight. OBJECTIVE:  Patient Vitals for the past 24 hrs:   Temp Pulse BP   10/15/19 0847 98.1 °F (36.7 °C) 84    10/15/19 0309 97.7 °F (36.5 °C) 81 114/67   10/14/19 2123 98 °F (36.7 °C) 90 119/70   10/14/19 2030 98.4 °F (36.9 °C) 94 122/73   10/14/19 2015  96 139/50   10/14/19 2010  (!) 101 144/67   10/14/19 2005  (!) 101 147/68   10/14/19 2000  (!) 106 161/83   10/14/19 1955  (!) 104 169/82   10/14/19 1950  (!) 102 152/49   10/14/19 1945  (!) 103 152/74   10/14/19 1940  (!) 102 152/79   10/14/19 1935  (!) 103 172/74   10/14/19 1930  (!) 111 (!) 177/95   10/14/19 1925  (!) 111 (!) 188/96   10/14/19 1922  (!) 115 (!) 174/93   10/14/19 1921  98 (!) 188/93   10/14/19 1918   158/85       Alert, no distress. Lying in bed. No family present. Respirations unlabored. Splint CDI RLE. Moves toes OK and CR brisk. SILT dorsal and plantar forefoot. Right knee no TTP and no effusion. No right groin pain log roll. Moves BUE and LLE OK. Recent Labs     10/14/19  2019   HGB 11.1*   HCT 33.7*      BUN 14   CREA 0.62   GFRAA >60   GFRNA >60     ASSESSMENT:  Trimalleolar fracture right ankle. PLAN:  To OR for ORIF this afternoon Dr. Bryon Alvarez. Consent signed and placed on chart. IVF  PT/OT: expect NWB RLE post-op. May consider knee scooter. Analgesics: IV Dilaudid pre-op and attempt PO post-op. Zofran prn. DVT proph: hold chemical proph pre-op. Disp planning. Patient hoping to home with assistive device. Son will stay with her for about 1 week and she has strong support network. She travels by airplane frequently for work so she'll likely need to work from home for 6-8 weeks.     SEAN Cano  Orthopedic Trauma Service  [address]

## 2019-10-15 NOTE — H&P
ORTHOPAEDIC H&P    Subjective:     Date of Consultation:  October 14, 2019    Brenna Agudelo is a 47 y.o. female with PMH significant for hypothyroidism and depression is seen for right ankle pain and deformity following a fall. She was running after one of her dogs when she stepped off a curb onto some leaves and felt her ankle give way. She had immediate pain and was unable to stand. Her neighbors heard her calls for help and called EMS who brought her to the ED where she was found to have a fracture dislocation of her right ankle for which we have been asked to see the patient. She describes pain globally about the ankle with some radiation into the foot. She denies knee or hip pain. She denies tingling or numbness. She denies open injuries. She denies previous Ortho relationship. She lives at home in a single story house but has no family nearby. She travels a lot for work. Patient Active Problem List    Diagnosis Date Noted    Fracture dislocation of ankle joint, right, closed, initial encounter 10/14/2019     History reviewed. No pertinent family history. Social History     Tobacco Use    Smoking status: Never Smoker    Smokeless tobacco: Never Used   Substance Use Topics    Alcohol use: Yes     Comment: rarely     Past Medical History:   Diagnosis Date    Hypothyroidism       History reviewed. No pertinent surgical history. Prior to Admission medications    Medication Sig Start Date End Date Taking? Authorizing Provider   escitalopram oxalate (LEXAPRO) 10 mg tablet Take 10 mg by mouth daily. Yes Provider, Historical   levothyroxine (SYNTHROID) 50 mcg tablet Take 50 mcg by mouth Daily (before breakfast). Yes Provider, Historical   estrogens, conjugated (CONJUGATED ESTROGENS PO) Take  by mouth daily. Yes Provider, Historical   cholecalciferol (VITAMIN D3) (1000 Units /25 mcg) tablet Take 1,000 Units by mouth daily.    Yes Provider, Historical     Current Facility-Administered Medications Medication Dose Route Frequency    ketamine (KETALAR) injection 82 mg  1 mg/kg IntraVENous NOW    ketamine (KETALAR) 50 mg/mL injection        sodium chloride (NS) flush 5-40 mL  5-40 mL IntraVENous Q8H    sodium chloride (NS) flush 5-40 mL  5-40 mL IntraVENous PRN    acetaminophen (TYLENOL) tablet 650 mg  650 mg Oral Q6H    HYDROmorphone (PF) (DILAUDID) injection 2 mg  2 mg IntraVENous Q3H PRN    naloxone (NARCAN) injection 0.4 mg  0.4 mg IntraVENous PRN    diphenhydrAMINE (BENADRYL) injection 12.5 mg  12.5 mg IntraVENous Q4H PRN    ondansetron (ZOFRAN ODT) tablet 4 mg  4 mg Oral Q6H PRN    docusate sodium (COLACE) capsule 100 mg  100 mg Oral BID    oxyCODONE IR (ROXICODONE) tablet 5-10 mg  5-10 mg Oral Q4H PRN     Current Outpatient Medications   Medication Sig    escitalopram oxalate (LEXAPRO) 10 mg tablet Take 10 mg by mouth daily.  levothyroxine (SYNTHROID) 50 mcg tablet Take 50 mcg by mouth Daily (before breakfast).  estrogens, conjugated (CONJUGATED ESTROGENS PO) Take  by mouth daily.  cholecalciferol (VITAMIN D3) (1000 Units /25 mcg) tablet Take 1,000 Units by mouth daily. No Known Allergies     Review of Systems:  Pertinent items are noted in HPI.     Mental Status: no dementia    Objective:     Patient Vitals for the past 8 hrs:   BP Temp Pulse Resp SpO2 Weight   10/14/19 2030 122/73 98.4 °F (36.9 °C) 94 12 95 %    10/14/19 2015 139/50  96 17 94 %    10/14/19 2010 144/67  (!) 101 15 97 %    10/14/19 2005 147/68  (!) 101 13 97 %    10/14/19 2000 161/83  (!) 106 16 95 %    10/14/19 1955 169/82  (!) 104 11 94 %    10/14/19 1950 152/49  (!) 102 13 100 %    10/14/19 1945 152/74  (!) 103 12 100 %    10/14/19 1940 152/79  (!) 102 15 100 %    10/14/19 1935 172/74  (!) 103 15 100 %    10/14/19 1930 (!) 177/95  (!) 111 11 100 %    10/14/19 1925 (!) 188/96  (!) 111 (!) 5 100 %    10/14/19 1922 (!) 174/93  (!) 115 18 100 %    10/14/19 1921 (!) 188/93  98 10 100 %  10/14/19 1918 158/85   14 100 %    10/14/19 1907      82 kg (180 lb 12.4 oz)     Temp (24hrs), Av.4 °F (36.9 °C), Min:98.4 °F (36.9 °C), Max:98.4 °F (36.9 °C)      EXAM: Awake and alert; appears uncomfortable; agreeable to exam  Right ankle with significant eversion injury with tenting of the skin over the medial maleolus  There is a small 1cm sq abrasion over the anteromedial joint surface with no active bleeding or deep structures visible  Distal sensory function grossly intact  Moves toes to command  Distal pulses palpable    Imaging Review: FINDINGS: Two views of the right ankle demonstrate acute displaced and angulated  fractures of the distal fibula and lateral malleolus with 100% lateral  displacement of the talus relative to the tibia. A fracture fragment superior to  the lateral tibial plafond may reflect a fibular fragment or posterior malleolus  fragment.      IMPRESSION  IMPRESSION: Acute fracture this location dislocation of the ankle as above. .    Labs:   Recent Results (from the past 24 hour(s))   EKG, 12 LEAD, INITIAL    Collection Time: 10/14/19  8:16 PM   Result Value Ref Range    Ventricular Rate 92 BPM    Atrial Rate 92 BPM    P-R Interval 130 ms    QRS Duration 86 ms    Q-T Interval 362 ms    QTC Calculation (Bezet) 447 ms    Calculated P Axis 64 degrees    Calculated R Axis 70 degrees    Calculated T Axis 36 degrees    Diagnosis       Normal sinus rhythm  Nonspecific ST abnormality  No previous ECGs available     CBC W/O DIFF    Collection Time: 10/14/19  8:19 PM   Result Value Ref Range    WBC 12.1 (H) 3.6 - 11.0 K/uL    RBC 3.61 (L) 3.80 - 5.20 M/uL    HGB 11.1 (L) 11.5 - 16.0 g/dL    HCT 33.7 (L) 35.0 - 47.0 %    MCV 93.4 80.0 - 99.0 FL    MCH 30.7 26.0 - 34.0 PG    MCHC 32.9 30.0 - 36.5 g/dL    RDW 12.6 11.5 - 14.5 %    PLATELET 016 038 - 869 K/uL    MPV 9.3 8.9 - 12.9 FL    NRBC 0.0 0  WBC    ABSOLUTE NRBC 0.00 0.00 - 0.01 K/uL   SAMPLES BEING HELD    Collection Time: 10/14/19  8:19 PM   Result Value Ref Range    SAMPLES BEING HELD 1 DAKOTA, 1 RED     COMMENT        Add-on orders for these samples will be processed based on acceptable specimen integrity and analyte stability, which may vary by analyte.          Impression:     Active Problems:    Fracture dislocation of ankle joint, right, closed, initial encounter (10/14/2019)        Plan:     Acute trimalleolar ankle fracture dislocation - will require urgent closed reduction and immobilization with subsequent surgical fixation  Admit to Ortho  Pre-op labs - is healthy overall so likely does not need Hospitalist input  Bed rest  Pain control  Ice/elevation  NPO after midnight except meds  Consent for ORIF right ankle  To OR Tues afternoon with Dr Yahaira Negro aware of patient and in agreement with current plan of care    Hansel Mcnamara PA-C  Orthopedic Trauma Service  [address]

## 2019-10-15 NOTE — H&P
H&P    Subjective:     Date of Consultation:  October 15, 2019    Referring Physician:  YUNG    Sharyle Crock is a 47 y.o. female with PMH significant for hypothyroidism and depression is seen for right ankle pain and deformity following a fall. She was running after one of her dogs when she stepped off a curb onto some leaves and felt her ankle give way. She had immediate pain and was unable to stand. Her neighbors heard her calls for help and called EMS who brought her to the ED where she was found to have a fracture dislocation of her right ankle for which we have been asked to see the patient. She describes pain globally about the ankle with some radiation into the foot. She denies knee or hip pain. She denies tingling or numbness. She denies open injuries. She denies previous Ortho relationship. She lives at home in a single story house but has no family nearby. She travels a lot for work. Patient Active Problem List    Diagnosis Date Noted    Fracture dislocation of ankle joint, right, closed, initial encounter 10/14/2019     History reviewed. No pertinent family history. Social History     Tobacco Use    Smoking status: Never Smoker    Smokeless tobacco: Never Used   Substance Use Topics    Alcohol use: Yes     Comment: rarely     Past Medical History:   Diagnosis Date    Hypothyroidism       History reviewed. No pertinent surgical history. Prior to Admission medications    Medication Sig Start Date End Date Taking? Authorizing Provider   escitalopram oxalate (LEXAPRO) 10 mg tablet Take 10 mg by mouth daily. Yes Provider, Historical   levothyroxine (SYNTHROID) 50 mcg tablet Take 50 mcg by mouth Daily (before breakfast). Yes Provider, Historical   estrogens, conjugated (CONJUGATED ESTROGENS PO) Take  by mouth daily. Yes Provider, Historical   cholecalciferol (VITAMIN D3) (1000 Units /25 mcg) tablet Take 1,000 Units by mouth daily.    Yes Provider, Historical     Current Facility-Administered Medications   Medication Dose Route Frequency    dextrose 5 % - 0.45% NaCl infusion  100 mL/hr IntraVENous CONTINUOUS    lactated Ringers infusion 1,000 mL  1,000 mL IntraVENous CONTINUOUS    0.9% sodium chloride infusion  25 mL/hr IntraVENous CONTINUOUS    lidocaine (PF) (XYLOCAINE) 10 mg/mL (1 %) injection 0.1 mL  0.1 mL SubCUTAneous PRN    fentaNYL citrate (PF) injection 50 mcg  50 mcg IntraVENous PRN    midazolam (VERSED) injection 1 mg  1 mg IntraVENous PRN    midazolam (VERSED) injection 1 mg  1 mg IntraVENous PRN    acetaminophen (TYLENOL) tablet 650 mg  650 mg Oral ONCE    ropivacaine (PF) (NAROPIN) 5 mg/mL (0.5 %) injection 150 mg  150 mg Peripheral Nerve Block PRN    sodium chloride (NS) flush 5-40 mL  5-40 mL IntraVENous Q8H    sodium chloride (NS) flush 5-40 mL  5-40 mL IntraVENous PRN    acetaminophen (TYLENOL) tablet 650 mg  650 mg Oral Q6H    HYDROmorphone (PF) (DILAUDID) injection 2 mg  2 mg IntraVENous Q3H PRN    naloxone (NARCAN) injection 0.4 mg  0.4 mg IntraVENous PRN    diphenhydrAMINE (BENADRYL) injection 12.5 mg  12.5 mg IntraVENous Q4H PRN    ondansetron (ZOFRAN ODT) tablet 4 mg  4 mg Oral Q6H PRN    docusate sodium (COLACE) capsule 100 mg  100 mg Oral BID    oxyCODONE IR (ROXICODONE) tablet 5-10 mg  5-10 mg Oral Q4H PRN     Facility-Administered Medications Ordered in Other Encounters   Medication Dose Route Frequency    midazolam (VERSED) injection   IntraVENous PRN    lidocaine (PF) (XYLOCAINE) 20 mg/mL (2 %) injection   IntraVENous PRN    propofol (DIPRIVAN) 10 mg/mL injection   IntraVENous PRN    dexamethasone (DECADRON) 4 mg/mL injection    PRN    ondansetron (ZOFRAN) injection   IntraVENous PRN    lactated Ringers infusion   IntraVENous CONTINUOUS    HYDROmorphone (PF) (DILAUDID) injection    PRN    PHENYLephrine (NEOSYNEPHRINE) in NS syringe   IntraVENous PRN    ketamine (KETALAR) 10 mg/mL injection   IntraVENous PRN     No Known Allergies     Review of Systems:  Pertinent items are noted in HPI. Objective:     Patient Vitals for the past 8 hrs:   BP Temp Pulse Resp SpO2   10/15/19 1613 107/68 98.7 °F (37.1 °C) 89 16 96 %     Temp (24hrs), Av.2 °F (36.8 °C), Min:97.7 °F (36.5 °C), Max:98.7 °F (37.1 °C)      EXAM: Awake and alert; appears uncomfortable; agreeable to exam  Right ankle with significant eversion injury with tenting of the skin over the medial maleolus  There is a small 1cm sq abrasion over the anteromedial joint surface with no active bleeding or deep structures visible  Distal sensory function grossly intact  Moves toes to command  Distal pulses palpable     Imaging Review: FINDINGS: Two views of the right ankle demonstrate acute displaced and angulated  fractures of the distal fibula and lateral malleolus with 100% lateral  displacement of the talus relative to the tibia. A fracture fragment superior to  the lateral tibial plafond may reflect a fibular fragment or posterior malleolus  fragment.      IMPRESSION  IMPRESSION: Acute fracture this location dislocation of the ankle as above. .       Data Review   Recent Results (from the past 24 hour(s))   EKG, 12 LEAD, INITIAL    Collection Time: 10/14/19  8:16 PM   Result Value Ref Range    Ventricular Rate 92 BPM    Atrial Rate 92 BPM    P-R Interval 130 ms    QRS Duration 86 ms    Q-T Interval 362 ms    QTC Calculation (Bezet) 447 ms    Calculated P Axis 64 degrees    Calculated R Axis 70 degrees    Calculated T Axis 36 degrees    Diagnosis       Normal sinus rhythm  Nonspecific ST abnormality  No previous ECGs available  Confirmed by Viky Hough MD., --- ([id number]) on 10/15/2019 6:98:37 PM     METABOLIC PANEL, BASIC    Collection Time: 10/14/19  8:19 PM   Result Value Ref Range    Sodium 139 136 - 145 mmol/L    Potassium 3.5 3.5 - 5.1 mmol/L    Chloride 108 97 - 108 mmol/L    CO2 25 21 - 32 mmol/L    Anion gap 6 5 - 15 mmol/L    Glucose 111 (H) 65 - 100 mg/dL    BUN 14 6 - 20 MG/DL    Creatinine 0.62 0.55 - 1.02 MG/DL    BUN/Creatinine ratio 23 (H) 12 - 20      GFR est AA >60 >60 ml/min/1.73m2    GFR est non-AA >60 >60 ml/min/1.73m2    Calcium 8.4 (L) 8.5 - 10.1 MG/DL   CBC W/O DIFF    Collection Time: 10/14/19  8:19 PM   Result Value Ref Range    WBC 12.1 (H) 3.6 - 11.0 K/uL    RBC 3.61 (L) 3.80 - 5.20 M/uL    HGB 11.1 (L) 11.5 - 16.0 g/dL    HCT 33.7 (L) 35.0 - 47.0 %    MCV 93.4 80.0 - 99.0 FL    MCH 30.7 26.0 - 34.0 PG    MCHC 32.9 30.0 - 36.5 g/dL    RDW 12.6 11.5 - 14.5 %    PLATELET 769 354 - 021 K/uL    MPV 9.3 8.9 - 12.9 FL    NRBC 0.0 0  WBC    ABSOLUTE NRBC 0.00 0.00 - 0.01 K/uL   SAMPLES BEING HELD    Collection Time: 10/14/19  8:19 PM   Result Value Ref Range    SAMPLES BEING HELD 1 DAKOTA, 1 RED     COMMENT        Add-on orders for these samples will be processed based on acceptable specimen integrity and analyte stability, which may vary by analyte. URINALYSIS W/MICROSCOPIC    Collection Time: 10/14/19 10:08 PM   Result Value Ref Range    Color YELLOW/STRAW      Appearance CLOUDY (A) CLEAR      Specific gravity 1.022 1.003 - 1.030      pH (UA) 5.5 5.0 - 8.0      Protein TRACE (A) NEG mg/dL    Glucose NEGATIVE  NEG mg/dL    Ketone 15 (A) NEG mg/dL    Bilirubin NEGATIVE  NEG      Blood MODERATE (A) NEG      Urobilinogen 0.2 0.2 - 1.0 EU/dL    Nitrites NEGATIVE  NEG      Leukocyte Esterase NEGATIVE  NEG      WBC 5-10 0 - 4 /hpf    RBC 20-50 0 - 5 /hpf    Epithelial cells MODERATE (A) FEW /lpf    Bacteria NEGATIVE  NEG /hpf    Hyaline cast 2-5 0 - 5 /lpf         Assessment/Plan:     Acute trimalleolar ankle fracture dislocation - will require urgent closed reduction and immobilization with subsequent surgical fixation  Admitted to Ortho  Pre-op labs - is healthy overall so likely does not need Hospitalist input  Bed rest  Pain control  Ice/elevation  NPO after midnight except meds  Consented for ORIF right ankle. The risks of surgery were explained.   Risks of infection, blood loss, neurovascular injury, anesthesia risks, and risks secondary to patient comorbidities were explained.             Leona Sorto DO

## 2019-10-15 NOTE — PROCEDURES
PROCEDURE NOTE - FRACTURE REDUCTION: The patient was found to have a significantly displaced fracture dislocation of the right ankle require urgent reduction. Risks and benefits of closed reduction under sedation were discussed with the patient who agreed to proceed Consents signed and on chart. PAtient was induced with ketamine for procedrual sedation via the emergency department MD. After it was confirmed that reasonable sedation had been reached, a longitudinal traction in conjunction with re-creation of the injury maneuver was applied reducing the fracture. A plaster short leg splint with stirrup was applied and molded into position of relative foot inversion. The patient was aroused from anesthesia and tolerated the procedure well. Post-reduction plain films reviewed with confirmation of a satisfactory reduction of the fracture. The extremity was neurovascularly intact post reduction and splint placement.      Sha Maldonado PA-C  Orthopedic Trauma Service  [facility]

## 2019-10-15 NOTE — BRIEF OP NOTE
BRIEF OPERATIVE NOTE    Date of Procedure: 10/15/2019   Preoperative Diagnosis: RIGHT ANKLE FRACTURE DISLOCATION  Postoperative Diagnosis: RIGHT ANKLE FRACTURE DISLOCATION  , trimal  Procedure(s):  ANKLE OPEN REDUCTION INTERNAL FIXATION RIGHT ANKLE  Surgeon(s) and Role:     DO Pilar Steward Primary         Surgical Assistant: Alicia Fuentes staff    Surgical Staff:  Circ-1: Mine Calabrese RN  Registered Nurse Assistant: Jerry Jo RN  Scrub RN-1: Leonor DOVER  Event Time In Time Out   Incision Start 10/15/2019 1739    Incision Close 10/15/2019 1842      Anesthesia: General   Estimated Blood Loss: 20cc  Specimens: * No specimens in log *   Findings: trimal ankle fx   Complications: none  Implants:   Implant Name Type Inv.  Item Serial No.  Lot No. LRB No. Used Action   SCR BNE CRTX ST HEX 3.5X20MM -- SS - SN/A  SCR BNE CRTX ST HEX 3.5X20MM -- SS N/A SYNTH[employer] [place] N/A Right 1 Implanted   SCR BNE CRTX ST HEX 3.5X14MM -- SS - SN/A  SCR BNE CRTX ST HEX 3.5X14MM -- SS N/A SYNTH[employer] [place] N/A Right 3 Implanted   SCR BNE CRTX ST HEX 3.5X12MM -- SS - SN/A  SCR BNE CRTX ST HEX 3.5X12MM -- SS N/A SYN[employer] [place] N/A Right 1 Implanted   SCR BNE CANC FT 4X20MM SS --  - SN/A  SCR BNE CANC FT 4X20MM SS --  N/A SYNTH[employer] [place] N/A Right 1 Implanted   SCR BNE CANC FT 4X16MM SS --  - SN/A  SCR BNE CANC FT 4X16MM SS --  N/A SYNTHES [place] N/A Right 1 Implanted   SCR BNE LCK ST T25 3.5X38MM SS --  - SN/A  SCR BNE LCK ST T25 3.5X38MM SS --  N/A SYNTHES [place] N/A Right 1 Implanted   PLATE 1/3 TUBLR CLLR 8H 93MM --  - SN/A  PLATE 1/3 TUBLR CLLR 8H 93MM --  N/A SYNTHES [place] N/A Right 1 Implanted   SCR BNE CONSUELO SHRT THRD 4X44 TI --  - SN/A  SCR BNE CONSUELO SHRT THRD 4X44 TI --  N/A SYNTHES [place] N/A Right 2 Implanted

## 2019-10-16 PROCEDURE — 74011250637 HC RX REV CODE- 250/637: Performed by: PHYSICIAN ASSISTANT

## 2019-10-16 PROCEDURE — 97535 SELF CARE MNGMENT TRAINING: CPT

## 2019-10-16 PROCEDURE — 74011250636 HC RX REV CODE- 250/636: Performed by: ORTHOPAEDIC SURGERY

## 2019-10-16 PROCEDURE — 74011250637 HC RX REV CODE- 250/637: Performed by: ORTHOPAEDIC SURGERY

## 2019-10-16 PROCEDURE — 97161 PT EVAL LOW COMPLEX 20 MIN: CPT | Performed by: PHYSICAL THERAPIST

## 2019-10-16 PROCEDURE — 65270000029 HC RM PRIVATE

## 2019-10-16 PROCEDURE — 99218 HC RM OBSERVATION: CPT

## 2019-10-16 PROCEDURE — 97116 GAIT TRAINING THERAPY: CPT | Performed by: PHYSICAL THERAPIST

## 2019-10-16 PROCEDURE — 74011000258 HC RX REV CODE- 258: Performed by: PHYSICIAN ASSISTANT

## 2019-10-16 PROCEDURE — 94760 N-INVAS EAR/PLS OXIMETRY 1: CPT

## 2019-10-16 PROCEDURE — 97165 OT EVAL LOW COMPLEX 30 MIN: CPT

## 2019-10-16 RX ORDER — LEVOTHYROXINE SODIUM 50 UG/1
50 TABLET ORAL
Status: DISCONTINUED | OUTPATIENT
Start: 2019-10-17 | End: 2019-10-17 | Stop reason: HOSPADM

## 2019-10-16 RX ORDER — MELATONIN
1000 DAILY
Status: DISCONTINUED | OUTPATIENT
Start: 2019-10-17 | End: 2019-10-17 | Stop reason: HOSPADM

## 2019-10-16 RX ORDER — ESCITALOPRAM OXALATE 10 MG/1
10 TABLET ORAL DAILY
Status: DISCONTINUED | OUTPATIENT
Start: 2019-10-17 | End: 2019-10-16

## 2019-10-16 RX ORDER — ESCITALOPRAM OXALATE 10 MG/1
10 TABLET ORAL DAILY
Status: DISCONTINUED | OUTPATIENT
Start: 2019-10-16 | End: 2019-10-17 | Stop reason: HOSPADM

## 2019-10-16 RX ADMIN — DOCUSATE SODIUM 100 MG: 100 CAPSULE, LIQUID FILLED ORAL at 19:00

## 2019-10-16 RX ADMIN — ASPIRIN 325 MG ORAL TABLET 325 MG: 325 PILL ORAL at 19:00

## 2019-10-16 RX ADMIN — DEXTROSE MONOHYDRATE AND SODIUM CHLORIDE 100 ML/HR: 5; .45 INJECTION, SOLUTION INTRAVENOUS at 14:08

## 2019-10-16 RX ADMIN — OXYCODONE HYDROCHLORIDE 10 MG: 5 TABLET ORAL at 14:02

## 2019-10-16 RX ADMIN — DOCUSATE SODIUM 100 MG: 100 CAPSULE, LIQUID FILLED ORAL at 08:50

## 2019-10-16 RX ADMIN — ACETAMINOPHEN 650 MG: 325 TABLET, FILM COATED ORAL at 21:18

## 2019-10-16 RX ADMIN — Medication 10 ML: at 14:09

## 2019-10-16 RX ADMIN — OXYCODONE HYDROCHLORIDE 5 MG: 5 TABLET ORAL at 08:49

## 2019-10-16 RX ADMIN — ACETAMINOPHEN 650 MG: 325 TABLET, FILM COATED ORAL at 03:44

## 2019-10-16 RX ADMIN — OXYCODONE HYDROCHLORIDE 10 MG: 5 TABLET ORAL at 19:00

## 2019-10-16 RX ADMIN — HYDROMORPHONE HYDROCHLORIDE 1 MG: 1 INJECTION, SOLUTION INTRAMUSCULAR; INTRAVENOUS; SUBCUTANEOUS at 03:47

## 2019-10-16 RX ADMIN — ASPIRIN 325 MG ORAL TABLET 325 MG: 325 PILL ORAL at 08:50

## 2019-10-16 RX ADMIN — ESCITALOPRAM OXALATE 10 MG: 10 TABLET ORAL at 19:00

## 2019-10-16 RX ADMIN — Medication 10 ML: at 21:18

## 2019-10-16 RX ADMIN — ACETAMINOPHEN 650 MG: 325 TABLET, FILM COATED ORAL at 14:19

## 2019-10-16 RX ADMIN — OXYCODONE HYDROCHLORIDE 10 MG: 5 TABLET ORAL at 22:56

## 2019-10-16 RX ADMIN — HYDROMORPHONE HYDROCHLORIDE 1 MG: 1 INJECTION, SOLUTION INTRAMUSCULAR; INTRAVENOUS; SUBCUTANEOUS at 16:35

## 2019-10-16 RX ADMIN — Medication 5 ML: at 06:00

## 2019-10-16 RX ADMIN — ACETAMINOPHEN 650 MG: 325 TABLET, FILM COATED ORAL at 08:49

## 2019-10-16 RX ADMIN — Medication 2 G: at 12:53

## 2019-10-16 RX ADMIN — HYDROMORPHONE HYDROCHLORIDE 1 MG: 1 INJECTION, SOLUTION INTRAMUSCULAR; INTRAVENOUS; SUBCUTANEOUS at 11:06

## 2019-10-16 RX ADMIN — Medication 2 G: at 03:44

## 2019-10-16 NOTE — PERIOP NOTES
2015 VS stable. Awake and alert. Resting comfortably. Patient denies nausea. Pain improved. No signs of excessive bleeding. Tolerating ice chips without difficulty. Ready to transfer from PACU.

## 2019-10-16 NOTE — OP NOTES
[address]  OPERATIVE REPORT    Name:  Shade Tristan  MR#:  [id number]  :  1965  ACCOUNT #:  [de-identified]  DATE OF SERVICE:  10/15/2019    PREOPERATIVE DIAGNOSIS:  Right trimalleolar ankle fracture. POSTOPERATIVE DIAGNOSIS:  Right trimalleolar ankle fracture. PROCEDURE PERFORMED:  Open reduction and internal fixation, right trimalleolar ankle fracture. SURGEON:  Tracie Thomson DO    ASSISTANT:  [facility] staff. ANESTHESIA:  General.    COMPLICATIONS:  None. SPECIMENS REMOVED:  None. IMPLANTS:  Synthes one-third tubular plate and screws with 4-0 cancellous screws for medial malleolar fixation. ESTIMATED BLOOD LOSS:  20 mL. DISPOSITION:  Stable to PACU. INDICATIONS FOR THE PROCEDURE:  The patient is a 51-year-old female, who presented to the hospital after a fall while chasing after her dog. She was found to have a trimalleolar ankle fracture and dislocation of the right ankle. She was closed reduced and splinted and had swelling, was deemed adequate for surgical treatment. We planned for ORIF once medically optimized. She was admitted overnight and we iced and elevated as much as possible. She was ready for surgery on 10/15/2019. History and physical forms and consent forms were confirmed in her chart. We discussed risks of surgery including infection, blood loss, neurovascular injury, anesthesia risk, and risk secondary to the patient's comorbidities. Informed consent was obtained. OPERATIVE PROCEDURE:  The patient was taken to the OR and transferred to the operating room table. She was placed in the supine position and all prominences were carefully padded. She was given sedation and LMA was placed by anesthesia. After sterile prepping and draping was performed, a time-out was called. The patient was identified by name, date of birth, operative site, and operative procedure.   After all were in agreement that the right ankle was the operative extremity, an incision was made after tourniquet was inflated to 300 mmHg. Incision for an approach to the lateral malleolus was performed in a longitudinal fashion overlying the fracture site. We carefully dissected down to avoid neurovascular structures and gained access to distal fibular fracture. There was some comminution noted. We used a sharp reduction tenaculum after cleaning the fracture site and exposing the distal fibula. We reduced the fracture to its anatomic alignment and placed a 3.5 mm lag screw in a lag by technique fashion. We then placed an 8-hole Synthes one-third tubular locking plate and placed 4 cortical screws proximal to the fracture site and 3 cancellous screws distal to the fracture site with adequate visualization of the fracture. We then visualized our medial malleolus fracture and there was some step-off of the medial malleolus that remained so we could not feel that we could percutaneously fix the medial malleolus. Therefore, an incision was made and careful approach was made for the medial malleolus fracture and we used x-rays throughout the procedure to place guide pin in the medial malleolus after anatomic reduction and cleaning of the fracture site. We then measured for placement of short-threaded 4-0 cancellous lag screws. Two lag screws were placed and this gave us a nice anatomic reduction of our fracture site. After reducing and fixing our lateral and medial malleolus, our posterior malleolus was well reduced also. Therefore, we decided to leave this small posterior malleolus fracture fragments in an anatomic reduced fashion. Thorough irrigation of the wound was performed after final x-rays and closure with combination of 0 Vicryl, 2-0 Vicryl, and 3-0 nylon suture was used to close the wounds. We placed sterile dressings and the patient was awoken by Anesthesia. A posterior slab splint was placed without difficulty.   She was transferred to PACU in stable condition and will be followed closely in the recovery unit. She will be monitored in the hospital overnight and work with therapy for crutch training tomorrow.       Emilee Ruiz DO DD/[email or web address]_I/RAKESH_RADHAK_P  D:  10/15/2019 19:11  T:  10/16/2019 5:48  JOB #:  [id number]

## 2019-10-16 NOTE — DISCHARGE INSTRUCTIONS
Diet:  Eat your regular diet. If you are nauseated, be sure to try and keep at least clear liquids down and stay hydrated. If you are taking pain medications, try to eat a diet high in fiber, drink plenty of water and take an over-the-counter stool softener to avoid constipation. Activity: Keep your foot elevated above your heart as much as possible to decrease swelling. non-weight bearing on your operative foot. Dressing:  Keep your dressing/splint clean and dry. Dr. Kieran Ford will change it at your post-operative visit. If it becomes soiled or wet, call Dr. Alcaraz Form. Follow-up: 1 week with Dr. Kieran Ford. Call [phone] for an appointment if you do not have one.           Reasons to call your doctor:  Fever above 101 degrees  Severe pain  Uncontrolled nausea/vomiting  Increase in redness swelling  Bleeding through the dressing  Shortness of breath or chest pain

## 2019-10-16 NOTE — PERIOP NOTES
TRANSFER - OUT REPORT:    Verbal report given to Mary Ellen(name) on [facility]  being transferred to 57(unit) for routine post - op       Report consisted of patients Situation, Background, Assessment and   Recommendations(SBAR). Time Pre op antibiotic given:1728  Anesthesia Stop time: 1900    Information from the following report(s) SBAR, OR Summary, Intake/Output, MAR and Cardiac Rhythm NSR. was reviewed with the receiving nurse. Opportunity for questions and clarification was provided. Is the patient on 02? YES       L/Min 2    Is the patient on a monitor? NO    Is the nurse transporting with the patient? NO    Surgical Waiting Area notified of patient's transfer from PACU?  YES      The following personal items collected during your admission accompanied patient upon transfer:   Dental Appliance: Dental Appliances: None  Vision: Visual Aid: None  Hearing Aid:    Jewelry:    Clothing:    Other Valuables:    Valuables sent to safe:

## 2019-10-16 NOTE — PROGRESS NOTES
PHYSICAL THERAPY EVALUATION/DISCHARGE  Patient: Rachel Kearns (89 y.o. female)  Date: 10/16/2019  Primary Diagnosis: Fracture dislocation of ankle joint, right, closed, initial encounter [S82.815K]  Procedure(s) (LRB):  ANKLE OPEN REDUCTION INTERNAL FIXATION RIGHT ANKLE (Right) 1 Day Post-Op   Precautions:   Fall, NWB      ASSESSMENT  Based on the objective data described below, the patient presents with decreased functional mobility from baseline level of function. Patient currently needing SBA-CGA for ambulation with RW and also worked with knee scooter. Able to up/down 4 stairs with B axillary crutches and no rails with CGA. Son will assist her at home initially and he will obtain RW, knee scooter, and tub transfer bench. PT provided crutches. Patient is safe to DC home from a mobility standpoint. Does not require further PT services and [facility]. Patient is cleared for discharge from PT standpoint:  YES [x]     NO []       Other factors to consider for discharge: pain, lives alone at baseline, at risk for falls     Further skilled acute physical therapy is not indicated at this time. PLAN :  Recommendation for discharge: (in order for the patient to meet his/her long term goals)  No skilled physical therapy/ follow up rehabilitation needs identified at this time. Equipment recommendations for successful discharge: provided crutches for patient and recommended RW, knee scooter, and tub transfer bench (son to purchase these items)       SUBJECTIVE:   Patient stated I usually am fine getting around.     OBJECTIVE DATA SUMMARY:   HISTORY:    Past Medical History:   Diagnosis Date    Hypothyroidism    History reviewed. No pertinent surgical history. Prior level of function: Independent and active. Lives alone but son will be assisting at [place].   Travels for work  Personal factors and/or comorbidities impacting plan of care:     Home Situation  Home Environment: Private residence  # Steps to Enter: 2  Rails to Enter: No  One/Two Story Residence: One story  Living Alone: No  Support Systems: Family member(s)  Patient Expects to be Discharged to[de-identified] Private residence  Current DME Used/Available at Home: None  Tub or Shower Type: Tub/Shower combination    EXAMINATION/PRESENTATION/DECISION MAKING:   Critical Behavior:  Neurologic State: Alert  Orientation Level: Oriented X4  Cognition: Appropriate decision making, Appropriate for age attention/concentration, Appropriate safety awareness, Follows commands  Safety/Judgement: Awareness of environment, Fall prevention  Hearing: Auditory  Auditory Impairment: None    Range Of Motion:  AROM: Generally decreased, functional                       Strength:    Strength: Generally decreased, functional                    Tone & Sensation:   Tone: Normal              Sensation: Intact               Coordination:  Coordination: Within functional limits  Vision:   Tracking: Able to track stimulus in all quadrants w/o difficulty  Acuity: Within Defined Limits  Functional Mobility:  Bed Mobility:  Rolling: Supervision  Supine to Sit: Supervision  Sit to Supine: Supervision  Scooting: Supervision  Transfers:  Sit to Stand: Contact guard assistance;Stand-by assistance  Stand to Sit: Stand-by assistance                       Balance:   Sitting: Intact  Standing: Intact; With support  Ambulation/Gait Training:  Distance (ft): 100 Feet (ft)(also 20 feet with RW)  Assistive Device: Gait belt;Walker, rolling(100 ft wtih knee scooter)  Ambulation - Level of Assistance: Contact guard assistance     Gait Description (WDL): Exceptions to WDL  Gait Abnormalities: Antalgic;Decreased step clearance; Step to gait  Right Side Weight Bearing: Non-weight bearing     Base of Support: Center of gravity altered;Narrowed  Stance: Right decreased  Speed/Susan: Pace decreased (<100 feet/min); Slow           Stairs:  Number of Stairs Trained: 4  Stairs - Level of Assistance: Contact guard assistance;Assist X1   Rail Use: None(B axillary crutches)      Pain Rating:  Reports some pain in dependent position but does not rate    Activity Tolerance:   Good  Please refer to the flowsheet for vital signs taken during this treatment. After treatment patient left in no apparent distress:   Sitting in chair, Call bell within reach and Caregiver / family present    COMMUNICATION/EDUCATION:   The patients plan of care was discussed with: Physical Therapist, Occupational Therapist and Registered Nurse. Fall prevention education was provided and the patient/caregiver indicated understanding., Patient/family have participated as able in goal setting and plan of care. and Patient/family agree to work toward stated goals and plan of care.     Thank you for this referral.  Florinda Hilario, PT, DPT   Time Calculation: 25 mins

## 2019-10-16 NOTE — PROGRESS NOTES
Ortho     POD#1 Right Ankle ORIF  Splint dressing removed, marked swelling as expected, blister noted medial ankle posterior to incision  Medial and lateral incisions intact, bleeding  NVI  Supplied with fracture boot but far too swollen to fit into that today  Changed dressing and re-applied splint  Strict ice and elevation  NWB  D/C home  F/U as instructed    Cyn Jauregui PA-C  10/6/19  12:46

## 2019-10-16 NOTE — PROGRESS NOTES
Bedside and Verbal shift change report given to Anderson Amato RN (oncoming nurse) by Max Shane RN (offgoing nurse). Report included the following information SBAR, Kardex, Intake/Output, MAR and Recent Results.

## 2019-10-16 NOTE — PROGRESS NOTES
OCCUPATIONAL THERAPY EVALUATION/DISCHARGE  Patient: Wanda Chase (32 y.o. female)  Date: 10/16/2019  Primary Diagnosis: Fracture dislocation of ankle joint, right, closed, initial encounter [S82.141N]  Procedure(s) (LRB):  ANKLE OPEN REDUCTION INTERNAL FIXATION RIGHT ANKLE (Right) 1 Day Post-Op   Precautions: Fall, NWB RLE    ASSESSMENT  Based on the objective data described below, the patient presents with good balance, safety awareness, functional reach to distal BLEs, and insight into deficits s/p R ankle ORIF POD #1 after sustaining a GLF. PTA, she was IND, living alone, working full time with frequent airplane travel. She now presents slightly below her baseline, limited mostly by RLE NWB status. She demo'd good RW management with toileting, grooming, and simulated lower body ADLs. Discussed shower safety with recommendation for a tub transfer bench, patient's son ordering at session end. No further acute OT needs, clear to d/c from OT standpoint as son will be staying with her ~1 week. Current Level of Function (ADLs/self-care): IND-CGA for ADLs and mobility    Functional Outcome Measure: The patient scored 80/100 on the Barthel Index outcome measure which is indicative of 20% impairment in ADLs and mobility. Other factors to consider for discharge: good support at home, insight into deficits     PLAN :  Recommend with staff: Recommend with nursing patient to complete as able in order to maintain strength, endurance and independence: ADLs with supervision/setup, OOB to chair 3x/day and mobilizing to the bathroom for toileting with SPV & RW. Thank you for your assistance. Recommendation for discharge: (in order for the patient to meet his/her long term goals)  No skilled occupational therapy/ follow up rehabilitation needs identified at this time.     This discharge recommendation:  Has been made in collaboration with the attending provider and/or case management    Equipment recommendations for successful discharge: tub transfer bench, PT ordering crutches & RW       SUBJECTIVE:   Patient stated I don't feel too bad but gravity is what causes the most pain.     OBJECTIVE DATA SUMMARY:   HISTORY:   Past Medical History:   Diagnosis Date    Hypothyroidism    History reviewed. No pertinent surgical history. Prior Level of Function/Environment/Context: IND, lives alone, frequently flies around the country for work travel, son in from [place] to stay with her for ~1 week  Expanded or extensive additional review of patient history:   Home Situation  Home Environment: Private residence  # Steps to Enter: 2  Rails to Enter: No  One/Two Story Residence: One story  Living Alone: No  Support Systems: Family member(s)  Patient Expects to be Discharged to[de-identified] Private residence  Current DME Used/Available at Home: None  Tub or Shower Type: Tub/Shower combination    Hand dominance: Right    EXAMINATION OF PERFORMANCE DEFICITS:  Cognitive/Behavioral Status:  Neurologic State: Alert  Orientation Level: Oriented X4  Cognition: Appropriate decision making; Appropriate for age attention/concentration; Appropriate safety awareness; Follows commands  Perception: Appears intact  Perseveration: No perseveration noted  Safety/Judgement: Awareness of environment; Fall prevention    Skin: Appears intact    Edema: Typical RLE post-op swelling    Hearing: Auditory  Auditory Impairment: None    Vision/Perceptual:    Tracking: Able to track stimulus in all quadrants w/o difficulty                      Acuity: Within Defined Limits         Range of Motion:  AROM: Generally decreased, functional                         Strength:  Strength: Generally decreased, functional                Coordination:  Coordination: Within functional limits  Fine Motor Skills-Upper: Left Intact; Right Intact    Gross Motor Skills-Upper: Left Intact; Right Intact    Tone & Sensation:  Tone: Normal  Sensation: Intact                      Balance:  Sitting: Intact  Standing: Intact; With support    Functional Mobility and Transfers for ADLs:  Bed Mobility:  Rolling: Supervision  Supine to Sit: Supervision  Sit to Supine: Supervision  Scooting: Supervision    Transfers:  Sit to Stand: Contact guard assistance;Stand-by assistance  Stand to Sit: Stand-by assistance  Bathroom Mobility: Stand-by assistance  Toilet Transfer : Supervision  Tub Transfer: (reviewed tub transfer bench)    ADL Assessment:  Feeding: Independent    Oral Facial Hygiene/Grooming: Supervision(standing at the sink)    Bathing: Contact guard assistance(infer for transfer in/out)    Upper Body Dressing: Independent    Lower Body Dressing: Stand-by assistance    Toileting: Supervision                ADL Intervention and task modifications:       Grooming  Grooming Assistance: Supervision(standing at the sink)  Washing Hands: Supervision         Lower Body Bathing  Lower Body : Compensatory technique training  Position Performed: Seated in chair  Cues: Verbal cues provided;Visual cues provided(encouraged tub transfer bench use)         Lower Body Dressing Assistance  Underpants: Supervision  Socks: Supervision(simulated 2* bandaging on RLE)  Leg Crossed Method Used: No  Position Performed: Bending forward method  Cues: Verbal cues provided;Visual cues provided    Toileting  Toileting Assistance: Supervision  Bladder Hygiene: Supervision  Clothing Management: Supervision  Cues: Verbal cues provided;Visual cues provided  Adaptive Equipment: Grab bars    Cognitive Retraining  Safety/Judgement: Awareness of environment; Fall prevention    Functional Measure:  Barthel Index:    Bathin  Bladder: 10  Bowels: 10  Groomin  Dressing: 10  Feeding: 10  Mobility: 10  Stairs: 5  Toilet Use: 10  Transfer (Bed to Chair and Back): 10  Total: 80/100        The Barthel ADL Index: Guidelines  1. The index should be used as a record of what a patient does, not as a record of what a patient could do.   2. The main aim is to establish degree of independence from any help, physical or verbal, however minor and for whatever reason. 3. The need for supervision renders the patient not independent. 4. A patient's performance should be established using the best available evidence. Asking the patient, friends/relatives and nurses are the usual sources, but direct observation and common sense are also important. However direct testing is not needed. 5. Usually the patient's performance over the preceding 24-48 hours is important, but occasionally longer periods will be relevant. 6. Middle categories imply that the patient supplies over 50 per cent of the effort. 7. Use of aids to be independent is allowed. Brittany Mazariegos., Barthel, D.W. (3672). Functional evaluation: the Barthel Index. [address] [facility] (14)2. Lincoln Dickson meka MINESH Matthew, Sheri Jose., Shira Awad., [place], [address] (1999). Measuring the change indisability after inpatient rehabilitation; comparison of the responsiveness of the Barthel Index and Functional [place] Measure. Journal of Neurology, Neurosurgery, and Psychiatry, 66(4), 478-630. Cherelle Scherer, N.J.A, ALBARO Ortiz, & Sabrina Narvaez, M.A. (2004.) Assessment of post-stroke quality of life in cost-effectiveness studies: The usefulness of the Barthel Index and the EuroQoL-5D.  Quality of Life Research, 15, 813-54         Occupational Therapy Evaluation Charge Determination   History Examination Decision-Making   LOW Complexity : Brief history review  LOW Complexity : 1-3 performance deficits relating to physical, cognitive , or psychosocial skils that result in activity limitations and / or participation restrictions  LOW Complexity : No comorbidities that affect functional and no verbal or physical assistance needed to complete eval tasks       Based on the above components, the patient evaluation is determined to be of the following complexity level: LOW   Pain Rating:  Soreness reported in RLE, not rated numerically    Activity Tolerance:   Good  Please refer to the flowsheet for vital signs taken during this treatment. After treatment patient left in no apparent distress:    Sitting in chair, Call bell within reach and Caregiver / family present    COMMUNICATION/EDUCATION:   The patients plan of care was discussed with: Physical Therapist and Registered Nurse.     Thank you for this referral.  JORGE Mack, OTR/L  Time Calculation: 31 mins

## 2019-10-16 NOTE — PROGRESS NOTES
Bedside and Verbal shift change report given to Jossy Posada RN (oncoming nurse) by Michael Cabrera RN (offgoing nurse). Report included the following information Intake/Output, MAR and [employer].

## 2019-10-16 NOTE — ANESTHESIA POSTPROCEDURE EVALUATION
Procedure(s):  ANKLE OPEN REDUCTION INTERNAL FIXATION RIGHT ANKLE. general    <BSHSIANPOST>    Vitals Value Taken Time   /63 10/15/2019  8:00 PM   Temp 36.8 °C (98.2 °F) 10/15/2019  8:00 PM   Pulse 88 10/15/2019  8:12 PM   Resp 5 10/15/2019  8:12 PM   SpO2 94 % 10/15/2019  8:12 PM   Vitals shown include unvalidated device data.

## 2019-10-17 VITALS
SYSTOLIC BLOOD PRESSURE: 128 MMHG | OXYGEN SATURATION: 98 % | BODY MASS INDEX: 26.78 KG/M2 | TEMPERATURE: 98.1 F | HEIGHT: 69 IN | WEIGHT: 180.78 LBS | DIASTOLIC BLOOD PRESSURE: 81 MMHG | HEART RATE: 81 BPM | RESPIRATION RATE: 16 BRPM

## 2019-10-17 PROCEDURE — 74011250637 HC RX REV CODE- 250/637: Performed by: PHYSICIAN ASSISTANT

## 2019-10-17 PROCEDURE — 74011250637 HC RX REV CODE- 250/637: Performed by: ORTHOPAEDIC SURGERY

## 2019-10-17 PROCEDURE — 94760 N-INVAS EAR/PLS OXIMETRY 1: CPT

## 2019-10-17 PROCEDURE — 99218 HC RM OBSERVATION: CPT

## 2019-10-17 RX ORDER — OXYCODONE HYDROCHLORIDE 5 MG/1
5-10 TABLET ORAL
Qty: 30 TAB | Refills: 0 | Status: SHIPPED | OUTPATIENT
Start: 2019-10-17 | End: 2019-10-24

## 2019-10-17 RX ORDER — ASPIRIN 325 MG
325 TABLET ORAL 2 TIMES DAILY
Qty: 30 TAB | Refills: 0 | Status: SHIPPED | OUTPATIENT
Start: 2019-10-17 | End: 2022-06-27

## 2019-10-17 RX ADMIN — ASPIRIN 325 MG ORAL TABLET 325 MG: 325 PILL ORAL at 10:52

## 2019-10-17 RX ADMIN — DOCUSATE SODIUM 100 MG: 100 CAPSULE, LIQUID FILLED ORAL at 10:52

## 2019-10-17 RX ADMIN — OXYCODONE HYDROCHLORIDE 10 MG: 5 TABLET ORAL at 02:01

## 2019-10-17 RX ADMIN — OXYCODONE HYDROCHLORIDE 10 MG: 5 TABLET ORAL at 12:24

## 2019-10-17 RX ADMIN — OXYCODONE HYDROCHLORIDE 10 MG: 5 TABLET ORAL at 07:20

## 2019-10-17 RX ADMIN — MELATONIN 1 TABLET: at 10:52

## 2019-10-17 RX ADMIN — ACETAMINOPHEN 650 MG: 325 TABLET, FILM COATED ORAL at 02:01

## 2019-10-17 RX ADMIN — ESCITALOPRAM OXALATE 10 MG: 10 TABLET ORAL at 10:52

## 2019-10-17 RX ADMIN — ACETAMINOPHEN 650 MG: 325 TABLET, FILM COATED ORAL at 10:51

## 2019-10-17 RX ADMIN — LEVOTHYROXINE SODIUM 50 MCG: 50 TABLET ORAL at 07:20

## 2019-10-17 NOTE — PROGRESS NOTES
Patient visited by [facility] Partner Volunteer on Orthopedic Joint Unit on 10/17/2019. Visited by Rev. Jose Manuel Denis MDiv, [facility], [facility] service: [phone])

## 2019-10-17 NOTE — PROGRESS NOTES
Bedside and Verbal shift change report given to Layton Yoder (oncoming nurse) by Gloria Duarte (offgoing nurse). Report included the following information SBAR, Kardex and MAR.

## 2019-10-17 NOTE — PROGRESS NOTES
Bedside shift change report given to Tim Ferris (oncoming nurse) by Gilmer Alonso (offgoing nurse). Report included the following information SBAR, Kardex, Intake/Output, MAR and Recent Results.

## 2019-10-17 NOTE — PROGRESS NOTES
Patient initially had orders to be discharged today. Nursing attempted discharge but pt stated her pain is not managed well enough and she does not feel safe to discharge in the rain. Pt's son now at bedside, arrived from [place] and will be staying with patient for the next week to care for her at home.

## 2019-10-18 NOTE — PROGRESS NOTES
I have reviewed discharge instructions with the patient. The patient verbalized understanding. Patient discharged with prescriptions for ASA for DVT proph and roxicodone. Patient provided with new ice packs and assisted in leaving with her new crutches and walker. Patient's son present for discharge instructions and picked up Rx's from [address] [place] [address].

## 2019-10-21 NOTE — DISCHARGE SUMMARY
Total Joint Discharge Summary      Patient ID:  Sharyle Crock  [id number]  47 y.o.  1965    Allergies: Patient has no known allergies. Admit date: 10/14/2019    Discharge date and time: 10/17/2019  1:48 PM     Admitting Physician: Cr Neal DO     Discharge Physician: Faviola Escamilla    * Admission Diagnoses: Fracture dislocation of ankle joint, right, closed, initial encounter [S82.891A]    * Discharge Diagnoses:   Hospital Problems as of 10/17/2019 Never Reviewed          Codes Class Noted - Resolved POA    Fracture dislocation of ankle joint, right, closed, initial encounter ICD-10-CM: S82.891A  ICD-9-CM: 824.8  10/14/2019 - Present Unknown              Surgeon: Faviola Escamilla    Preoperative Medical Clearance: obtained    * Procedure: Procedure(s):  ANKLE OPEN REDUCTION INTERNAL FIXATION RIGHT ANKLE           Perioperative Antibiotics: Ancef  ___                                                Vancomycin  ___      Postoperative Pain Management:  perc    DVT Prophylaxis:  ASA                                  JORGE Hose                                  Plexi-Pulse    Postoperative transfusions:     none Banked PRBCs     Post Op complications: none    Hemoglobin at discharge: ___    * Discharge Condition: good  Wound appears to be healing without any evidence of infection. Physical Therapy started on the day following surgery and progressed to independent ambulation with the aid of a crutches. At the time of discharge, able to go up and down stairs and had understanding of precautions needed following surgery.           * Discharged to: Home    * Follow-up Care/Discharge instructions:  - Anticoagulate with: asa bid  - Resume pre hospital diet            - Resume home medications per medical continuation form     - Follow up in office as scheduled       Signed:  Cr Neal DO  10/21/2019  5:33 PM

## 2019-10-21 NOTE — ADT AUTH CERT NOTES
PREVIOUSLY DENIED IP AUTH #[id number]  , MD AGREED TO OBS DOWNGRADED TO OBSERVATION 
ONCE RECEIVED AND COMPLETED, PLEASE FAX BACK CONFIRMATION AND NEW OBS AUTH#.  
Carolyne Sheffield

## 2022-03-19 PROBLEM — S82.891A FRACTURE DISLOCATION OF ANKLE JOINT, RIGHT, CLOSED, INITIAL ENCOUNTER: Status: ACTIVE | Noted: 2019-10-14

## 2022-06-27 ENCOUNTER — OFFICE VISIT (OUTPATIENT)
Dept: ORTHOPEDIC SURGERY | Age: 57
End: 2022-06-27
Payer: COMMERCIAL

## 2022-06-27 VITALS — HEIGHT: 69 IN | WEIGHT: 180 LBS | BODY MASS INDEX: 26.66 KG/M2

## 2022-06-27 DIAGNOSIS — M75.41 IMPINGEMENT SYNDROME OF RIGHT SHOULDER: ICD-10-CM

## 2022-06-27 DIAGNOSIS — M75.111 NONTRAUMATIC INCOMPLETE TEAR OF RIGHT ROTATOR CUFF: ICD-10-CM

## 2022-06-27 DIAGNOSIS — M75.01 ADHESIVE CAPSULITIS OF RIGHT SHOULDER: ICD-10-CM

## 2022-06-27 DIAGNOSIS — M67.921 BICEPS TENDINOPATHY, RIGHT: ICD-10-CM

## 2022-06-27 DIAGNOSIS — M25.511 ACUTE PAIN OF RIGHT SHOULDER: Primary | ICD-10-CM

## 2022-06-27 PROCEDURE — 99214 OFFICE O/P EST MOD 30 MIN: CPT | Performed by: ORTHOPAEDIC SURGERY

## 2022-06-27 PROCEDURE — 20610 DRAIN/INJ JOINT/BURSA W/O US: CPT | Performed by: ORTHOPAEDIC SURGERY

## 2022-06-27 RX ORDER — TRIAMCINOLONE ACETONIDE 40 MG/ML
40 INJECTION, SUSPENSION INTRA-ARTICULAR; INTRAMUSCULAR ONCE
Status: COMPLETED | OUTPATIENT
Start: 2022-06-27 | End: 2022-06-27

## 2022-06-27 RX ORDER — FLUTICASONE PROPIONATE 50 MCG
SPRAY, SUSPENSION (ML) NASAL
COMMUNITY
Start: 2022-06-26

## 2022-06-27 RX ORDER — BUPIVACAINE HYDROCHLORIDE 2.5 MG/ML
6 INJECTION, SOLUTION INFILTRATION; PERINEURAL ONCE
Status: COMPLETED | OUTPATIENT
Start: 2022-06-27 | End: 2022-06-27

## 2022-06-27 RX ADMIN — BUPIVACAINE HYDROCHLORIDE 15 MG: 2.5 INJECTION, SOLUTION INFILTRATION; PERINEURAL at 16:02

## 2022-06-27 RX ADMIN — TRIAMCINOLONE ACETONIDE 40 MG: 40 INJECTION, SUSPENSION INTRA-ARTICULAR; INTRAMUSCULAR at 16:02

## 2022-06-27 NOTE — PATIENT INSTRUCTIONS
Shoulder Stretches: Exercises  Introduction  Here are some examples of exercises for you to try. The exercises may be suggested for a condition or for rehabilitation. Start each exercise slowly. Ease off the exercises if you start to have pain. You will be told when to start these exercises and which ones will work best for you. How to do the exercises  Shoulder stretch    1.  a doorway and place one arm against the door frame. Your elbow should be a little higher than your shoulder. 2. Relax your shoulders as you lean forward, allowing your chest and shoulder muscles to stretch. You can also turn your body slightly away from your arm to stretch the muscles even more. 3. Hold for 15 to 30 seconds. 4. Repeat 2 to 4 times with each arm. Shoulder and chest stretch    1. Shoulder and chest stretch  2. While sitting, relax your upper body so you slump slightly in your chair. 3. As you breathe in, straighten your back and open your arms out to the sides. 4. Gently pull your shoulder blades back and downward. 5. Hold for 15 to 30 seconds as your breathe normally. 6. Repeat 2 to 4 times. Overhead stretch    1. Reach up over your head with both arms. 2. Hold for 15 to 30 seconds. 3. Repeat 2 to 4 times. Follow-up care is a key part of your treatment and safety. Be sure to make and go to all appointments, and call your doctor if you are having problems. It's also a good idea to know your test results and keep a list of the medicines you take. Where can you learn more? Go to http://www.gray.com/  Enter S254 in the search box to learn more about \"Shoulder Stretches: Exercises. \"  Current as of: July 1, 2021               Content Version: 13.2  © 5523-4027 [employer]. Care instructions adapted under license by [employer] (which disclaims liability or warranty for this information).  If you have questions about a medical condition or this instruction, always ask your healthcare professional. [place] [address] any warranty or liability for your use of this information. Rotator Cuff: Exercises  Introduction  Here are some examples of exercises for you to try. The exercises may be suggested for a condition or for rehabilitation. Start each exercise slowly. Ease off the exercises if you start to have pain. You will be told when to start these exercises and which ones will work best for you. How to do the exercises  Pendulum swing    If you have pain in your back, do not do this exercise. 1. Hold on to a table or the back of a chair with your good arm. Then bend forward a little and let your sore arm hang straight down. This exercise does not use the arm muscles. Rather, use your legs and your hips to create movement that makes your arm swing freely. 2. Use the movement from your hips and legs to guide the slightly swinging arm back and forth like a pendulum (or elephant trunk). Then guide it in circles that start small (about the size of a dinner plate). Make the circles a bit larger each day, as your pain allows. 3. Do this exercise for 5 minutes, 5 to 7 times each day. 4. As you have less pain, try bending over a little farther to do this exercise. This will increase the amount of movement at your shoulder. Posterior stretching exercise    1. Hold the elbow of your injured arm with your other hand. 2. Use your hand to pull your injured arm gently up and across your body. You will feel a gentle stretch across the back of your injured shoulder. 3. Hold for at least 15 to 30 seconds. Then slowly lower your arm. 4. Repeat 2 to 4 times. Up-the-back stretch    Your doctor or physical therapist may want you to wait to do this stretch until you have regained most of your range of motion and strength. You can do this stretch in different ways. Hold any of these stretches for at least 15 to 30 seconds. Repeat them 2 to 4 times.   1. Light stretch: Put your hand in your back pocket. Let it rest there to stretch your shoulder. 2. Moderate stretch: With your other hand, hold your injured arm (palm outward) behind your back by the wrist. Pull your arm up gently to stretch your shoulder. 3. Advanced stretch: Put a towel over your other shoulder. Put the hand of your injured arm behind your back. Now hold the back end of the towel. With the other hand, hold the front end of the towel in front of your body. Pull gently on the front end of the towel. This will bring your hand farther up your back to stretch your shoulder. Overhead stretch    1. Standing about an arm's length away, grasp onto a solid surface. You could use a countertop, a doorknob, or the back of a sturdy chair. 2. With your knees slightly bent, bend forward with your arms straight. Lower your upper body, and let your shoulders stretch. 3. As your shoulders are able to stretch farther, you may need to take a step or two backward. 4. Hold for at least 15 to 30 seconds. Then stand up and relax. If you had stepped back during your stretch, step forward so you can keep your hands on the solid surface. 5. Repeat 2 to 4 times. Shoulder flexion (lying down)    To make a wand for this exercise, use a piece of PVC pipe or a broom handle with the broom removed. Make the wand about a foot wider than your shoulders. 1. Lie on your back, holding a wand with both hands. Your palms should face down as you hold the wand. 2. Keeping your elbows straight, slowly raise your arms over your head. Raise them until you feel a stretch in your shoulders, upper back, and chest.  3. Hold for 15 to 30 seconds. 4. Repeat 2 to 4 times. Shoulder rotation (lying down)    To make a wand for this exercise, use a piece of PVC pipe or a broom handle with the broom removed. Make the wand about a foot wider than your shoulders. 1. Lie on your back.  Hold a wand with both hands with your elbows bent and palms up.  2. Keep your elbows close to your body, and move the wand across your body toward the sore arm. 3. Hold for 8 to 12 seconds. 4. Repeat 2 to 4 times. Wall climbing (to the side)    Avoid any movement that is straight to your side, and be careful not to arch your back. Your arm should stay about 30 degrees to the front of your side. 1. Stand with your side to a wall so that your fingers can just touch it at an angle about 30 degrees toward the front of your body. 2. Walk the fingers of your injured arm up the wall as high as pain permits. Try not to shrug your shoulder up toward your ear as you move your arm up. 3. Hold that position for a count of at least 15 to 20.  4. Walk your fingers back down to the starting position. 5. Repeat at least 2 to 4 times. Try to reach higher each time. Wall climbing (to the front)    During this stretching exercise, be careful not to arch your back. 1. Face a wall, and stand so your fingers can just touch it. 2. Keeping your shoulder down, walk the fingers of your injured arm up the wall as high as pain permits. (Don't shrug your shoulder up toward your ear.)  3. Hold your arm in that position for at least 15 to 30 seconds. 4. Slowly walk your fingers back down to where you started. 5. Repeat at least 2 to 4 times. Try to reach higher each time. Shoulder blade squeeze    1. Stand with your arms at your sides, and squeeze your shoulder blades together. Do not raise your shoulders up as you squeeze. 2. Hold 6 seconds. 3. Repeat 8 to 12 times. Scapular exercise: Arm reach    1. Lie flat on your back. This exercise is a very slight motion that starts with your arms raised (elbows straight, arms straight). 2. From this position, reach higher toward the jose or ceiling. Keep your elbows straight. All motion should be from your shoulder blade only. 3. Relax your arms back to where you started. 4. Repeat 8 to 12 times.   Arm raise to the side    During this strengthening exercise, your arm should stay about 30 degrees to the front of your side. 1. Slowly raise your injured arm to the side, with your thumb facing up. Raise your arm 60 degrees at the most (shoulder level is 90 degrees). 2. Hold the position for 3 to 5 seconds. Then lower your arm back to your side. If you need to, bring your \"good\" arm across your body and place it under the elbow as you lower your injured arm. Use your good arm to keep your injured arm from dropping down too fast.  3. Repeat 8 to 12 times. 4. When you first start out, don't hold any extra weight in your hand. As you get stronger, you may use a 1-pound to 2-pound dumbbell or a small can of food. Shoulder flexor and extensor exercise    These are isometric exercises. That means you contract your muscles without actually moving. 1. Push forward (flex): Stand facing a wall or doorjamb, about 6 inches or less back. Hold your injured arm against your body. Make a closed fist with your thumb on top. Then gently push your hand forward into the wall with about 25% to 50% of your strength. Don't let your body move backward as you push. Hold for about 6 seconds. Relax for a few seconds. Repeat 8 to 12 times. 2. Push backward (extend): Stand with your back flat against a wall. Your upper arm should be against the wall, with your elbow bent 90 degrees (your hand straight ahead). Push your elbow gently back against the wall with about 25% to 50% of your strength. Don't let your body move forward as you push. Hold for about 6 seconds. Relax for a few seconds. Repeat 8 to 12 times. Scapular exercise: Wall push-ups    This exercise is best done with your fingers somewhat turned out, rather than straight up and down. 1. Stand facing a wall, about 12 inches to 18 inches away. 2. Place your hands on the wall at shoulder height. 3. Slowly bend your elbows and bring your face to the wall. Keep your back and hips straight.   4. Push back to where you started. 5. Repeat 8 to 12 times. 6. When you can do this exercise against a wall comfortably, you can try it against a counter. You can then slowly progress to the end of a couch, then to a sturdy chair, and finally to the floor. Scapular exercise: Retraction    For this exercise, you will need elastic exercise material, such as surgical tubing or Thera-Band. 1. Put the band around a solid object at about waist level. (A bedpost will work well.) Each hand should hold an end of the band. 2. With your elbows at your sides and bent to 90 degrees, pull the band back. Your shoulder blades should move toward each other. Then move your arms back where you started. 3. Repeat 8 to 12 times. 4. If you have good range of motion in your shoulders, try this exercise with your arms lifted out to the sides. Keep your elbows at a 90-degree angle. Raise the elastic band up to about shoulder level. Pull the band back to move your shoulder blades toward each other. Then move your arms back where you started. Internal rotator strengthening exercise    1. Start by tying a piece of elastic exercise material to a doorknob. You can use surgical tubing or Thera-Band. 2. Stand or sit with your shoulder relaxed and your elbow bent 90 degrees. Your upper arm should rest comfortably against your side. Squeeze a rolled towel between your elbow and your body for comfort. This will help keep your arm at your side. 3. Hold one end of the elastic band in the hand of the painful arm. 4. Slowly rotate your forearm toward your body until it touches your belly. Slowly move it back to where you started. 5. Keep your elbow and upper arm firmly tucked against the towel roll or at your side. 6. Repeat 8 to 12 times. External rotator strengthening exercise    1. Start by tying a piece of elastic exercise material to a doorknob. You can use surgical tubing or Thera-Band. (You may also hold one end of the band in each hand.)  2.  Stand or sit with your shoulder relaxed and your elbow bent 90 degrees. Your upper arm should rest comfortably against your side. Squeeze a rolled towel between your elbow and your body for comfort. This will help keep your arm at your side. 3. Hold one end of the elastic band with the hand of the painful arm. 4. Start with your forearm across your belly. Slowly rotate the forearm out away from your body. Keep your elbow and upper arm tucked against the towel roll or the side of your body until you begin to feel tightness in your shoulder. Slowly move your arm back to where you started. 5. Repeat 8 to 12 times. Follow-up care is a key part of your treatment and safety. Be sure to make and go to all appointments, and call your doctor if you are having problems. It's also a good idea to know your test results and keep a list of the medicines you take. Where can you learn more? Go to http://www.gray.com/  Enter J005 in the search box to learn more about \"Rotator Cuff: Exercises. \"  Current as of: July 1, 2021               Content Version: 13.2  © 8969-6494 [employer]. Care instructions adapted under license by [employer] (which disclaims liability or warranty for this information). If you have questions about a medical condition or this instruction, always ask your healthcare professional. [address] any warranty or liability for your use of this information.

## 2022-06-27 NOTE — PROGRESS NOTES
Aida Aguila (: 1965) is a 62 y.o. female, established patient, here for evaluation of the following chief complaint(s):  Shoulder Pain (right shoulder)       ASSESSMENT/PLAN:  Below is the assessment and plan developed based on review of pertinent history, physical exam, labs, studies, and medications. She elected to try corticosteroid injection for the right shoulder today. We discussed the risks and benefits of injection and informed consent was obtained. After a sterile preparation, 6 cc of bupivicaine and 40 mg of Kenalog were injected into the right shoulder. The patient tolerated the procedure well and there were no complications. Post injection pain, blood sugar elevation, skin discoloration, fatty atrophy and the signs of infection were discussed in detail. The patient was instructed to contact us if there were any questions or concerns prior to their follow up appointment. If she continues with pain she will call us and we will consider ordering an MRI as I cannot rule out rotator cuff tear. 1. Acute pain of right shoulder  -     XR SHOULDER RT AP/LAT MIN 2 V; Future  -     REFERRAL TO PHYSICAL THERAPY  2. Biceps tendinopathy, right  -     REFERRAL TO PHYSICAL THERAPY  3. Nontraumatic incomplete tear of right rotator cuff  4. Impingement syndrome of right shoulder  -     REFERRAL TO PHYSICAL THERAPY  5. Adhesive capsulitis of right shoulder  -     bupivacaine HCl (MARCAINE) 0.25 % (2.5 mg/mL) injection 15 mg; 15 mg (6 mL), Other, ONCE, 1 dose, On 22 at 1600  -     triamcinolone acetonide (KENALOG-40) 40 mg/mL injection 40 mg; 40 mg, Intra artICUlar, ONCE, 1 dose, On 22 at 1600  -     REFERRAL TO PHYSICAL THERAPY      Return in about 6 weeks (around 2022). SUBJECTIVE/OBJECTIVE:  Aida Aguila (: 1965) is a 62 y.o. female.     She notes stiffness and aching pain in the right shoulder and along the proximal biceps tendon that has been ongoing for the last few months. She underwent similar symptoms and surgical treatment of the left shoulder years ago. She notes that this feels similar. She has tried physical therapy exercises, anti-inflammatories, and activity modifications with minimal relief. She notes occasional clicking in the shoulder. No Known Allergies    Current Outpatient Medications   Medication Sig    fluticasone propionate (FLONASE) 50 mcg/actuation nasal spray     levothyroxine (SYNTHROID) 50 mcg tablet Take 50 mcg by mouth Daily (before breakfast).  estrogens, conjugated (CONJUGATED ESTROGENS PO) Take  by mouth daily. Current Facility-Administered Medications   Medication    bupivacaine HCl (MARCAINE) 0.25 % (2.5 mg/mL) injection 15 mg    triamcinolone acetonide (KENALOG-40) 40 mg/mL injection 40 mg       Social History     Socioeconomic History    Marital status:      Spouse name: Not on file    Number of children: Not on file    Years of education: Not on file    Highest education level: Not on file   Occupational History    Not on file   Tobacco Use    Smoking status: Never Smoker    Smokeless tobacco: Never Used   Vaping Use    Vaping Use: Never used   Substance and Sexual Activity    Alcohol use: Yes     Comment: rarely    Drug use: Never    Sexual activity: Not on file   Other Topics Concern    Not on file   Social History Narrative    Not on file     Social Determinants of Health     Financial Resource Strain:     Difficulty of Paying Living Expenses: Not on file   Food Insecurity:     Worried About Running Out of Food in the Last Year: Not on file    Fidel of Food in the Last Year: Not on file   Transportation Needs:     Lack of Transportation (Medical): Not on file    Lack of Transportation (Non-Medical):  Not on file   Physical Activity:     Days of Exercise per Week: Not on file    Minutes of Exercise per Session: Not on file   Stress:     Feeling of Stress : Not on file Social Connections:     Frequency of Communication with Friends and Family: Not on file    Frequency of Social Gatherings with Friends and Family: Not on file    Attends Faith Services: Not on file    Active Member of Clubs or Organizations: Not on file    Attends Club or Organization Meetings: Not on file    Marital Status: Not on file   Intimate Partner Violence:     Fear of Current or Ex-Partner: Not on file    Emotionally Abused: Not on file    Physically Abused: Not on file    Sexually Abused: Not on file   Housing Stability:     Unable to Pay for Housing in the Last Year: Not on file    Number of [place] in the Last Year: Not on file    Unstable Housing in the Last Year: Not on file       Past Surgical History:   Procedure Laterality Date    HX ANKLE FRACTURE TX      right ankle    [place] ANESTH,SURGERY OF SHOULDER      left        History reviewed. No pertinent family history. OB History    No obstetric history on file. REVIEW OF SYSTEMS:    Patient denies any recent fever, chills, nausea, vomiting, chest pain, or shortness of breath. Vitals:  Ht 5' 8.5\" (1.74 m)   Wt 180 lb (81.6 kg)   BMI 26.97 kg/m²    Body mass index is 26.97 kg/m². PHYSICAL EXAM:  General exam: Patient is awake, alert, and oriented x3. Well-appearing. No acute distress. Ambulates with a normal gait. Right shoulder: Neurovascular and sensory intact. There is tenderness to palpation at the anterior lateral shoulder. There is limited passive and active overhead range of motion. Pain is noted with impingement testing including Hernandez exam.  Pain and weakness 4/5 is noted with rotator cuff strength testing including resisted abduction and resisted external rotation. Normal stability.   There is some tenderness palpation at the [facility] joint and pain with crossarm exam.  There is pain with proximal biceps tendon loading including speeds exam.        IMAGING:    XR Results (most recent):  Results from Appointment encounter on 06/27/22    XR SHOULDER RT AP/LAT MIN 2 V    Narrative  X-rays of the right shoulder 3 views done today show evidence of maintained glenohumeral joint space. No signs of acute fracture. Type II acromion      Results from Hospital Encounter encounter on 10/14/19    XR ANKLE RT MIN 3 V    Narrative  INTERPRETATION PROVIDED FOR COMPLIANCE ONLY AT NO CHARGE    4 spot fluoroscopic images of ankle surgery with side plate and screws in the  fibula and screws in the tibia. Ankle mortise appears intact. See operative  report for details. [facility] XR TECHNOLOGIST SERVICE    Narrative  Fluoroscopy was utilized. Impression  IMPRESSION:  FLUOROSCOPY WAS USED. Fluoro Dose:  0.346 mGy      vk         Orders Placed This Encounter    XR SHOULDER RT AP/LAT MIN 2 V     Standing Status:   Future     Number of Occurrences:   1     Standing Expiration Date:   7/27/2022     Order Specific Question:   Reason for Exam     Answer:   Shoulder pain    REFERRAL TO PHYSICAL THERAPY     Referral Priority:   Routine     Referral Type:   PT/OT/ST     Referral Reason:   Specialty Services Required     Number of Visits Requested:   1    bupivacaine HCl (MARCAINE) 0.25 % (2.5 mg/mL) injection 15 mg    triamcinolone acetonide (KENALOG-40) 40 mg/mL injection 40 mg              An electronic signature was used to authenticate this note.   -- Frida Agudelo DO

## 2022-06-28 RX ORDER — DICLOFENAC SODIUM 75 MG/1
75 TABLET, DELAYED RELEASE ORAL 2 TIMES DAILY
Qty: 60 TABLET | Refills: 3 | Status: SHIPPED | OUTPATIENT
Start: 2022-06-28

## 2022-11-07 ENCOUNTER — OFFICE VISIT (OUTPATIENT)
Dept: ORTHOPEDIC SURGERY | Age: 57
End: 2022-11-07
Payer: COMMERCIAL

## 2022-11-07 VITALS — BODY MASS INDEX: 26.66 KG/M2 | WEIGHT: 180 LBS | HEIGHT: 69 IN

## 2022-11-07 DIAGNOSIS — M25.511 ACUTE PAIN OF RIGHT SHOULDER: ICD-10-CM

## 2022-11-07 DIAGNOSIS — M67.921 BICEPS TENDINOPATHY, RIGHT: Primary | ICD-10-CM

## 2022-11-07 DIAGNOSIS — M75.101 TEAR OF RIGHT ROTATOR CUFF, UNSPECIFIED TEAR EXTENT, UNSPECIFIED WHETHER TRAUMATIC: ICD-10-CM

## 2022-11-07 DIAGNOSIS — M75.41 IMPINGEMENT SYNDROME OF RIGHT SHOULDER: ICD-10-CM

## 2022-11-07 DIAGNOSIS — M75.111 NONTRAUMATIC INCOMPLETE TEAR OF RIGHT ROTATOR CUFF: ICD-10-CM

## 2022-11-07 PROCEDURE — 99214 OFFICE O/P EST MOD 30 MIN: CPT | Performed by: ORTHOPAEDIC SURGERY

## 2022-11-07 RX ORDER — METHYLPREDNISOLONE 4 MG/1
TABLET ORAL
Qty: 1 DOSE PACK | Refills: 0 | Status: SHIPPED | OUTPATIENT
Start: 2022-11-07

## 2022-11-07 NOTE — PROGRESS NOTES
Akshat Garcia (: 1965) is a 62 y.o. female, established patient, here for evaluation of the following chief complaint(s):  Shoulder Pain (right)       ASSESSMENT/PLAN:  Below is the assessment and plan developed based on review of pertinent history, physical exam, labs, studies, and medications. Findings were discussed with the patient today. We will obtain an MRI which will help us with further treatment planning including the possibility of surgical treatment. Patient will follow up after this MRI is performed. 1. Biceps tendinopathy, right  2. Nontraumatic incomplete tear of right rotator cuff  3. Impingement syndrome of right shoulder  4. Acute pain of right shoulder  5. Tear of right rotator cuff, unspecified tear extent, unspecified whether traumatic  -     MRI SHOULDER RT WO CONT; Future      Return for imaging results after study performed. SUBJECTIVE/OBJECTIVE:  Akshat Garcia (: 1965) is a 62 y.o. female. She notes aching pain and clicking in the right shoulder. Symptoms have continued despite ice, anti-inflammatories, and previous corticosteroid injection. She is also done months of therapy exercises with minimal relief. She notes pain at night that awakens her. No Known Allergies    Current Outpatient Medications   Medication Sig    methylPREDNISolone (MEDROL DOSEPACK) 4 mg tablet Per dose pack instructions    diclofenac EC (VOLTAREN) 75 mg EC tablet Take 1 Tablet by mouth two (2) times a day. fluticasone propionate (FLONASE) 50 mcg/actuation nasal spray     levothyroxine (SYNTHROID) 50 mcg tablet Take 50 mcg by mouth Daily (before breakfast). estrogens, conjugated (CONJUGATED ESTROGENS PO) Take  by mouth daily. No current facility-administered medications for this visit.        Social History     Socioeconomic History    Marital status:      Spouse name: Not on file    Number of children: Not on file    Years of education: Not on file Highest education level: Not on file   Occupational History    Not on file   Tobacco Use    Smoking status: Never    Smokeless tobacco: Never   Vaping Use    Vaping Use: Never used   Substance and Sexual Activity    Alcohol use: Yes     Comment: rarely    Drug use: Never    Sexual activity: Not on file   Other Topics Concern    Not on file   Social History Narrative    Not on file     Social Determinants of Health     Financial Resource Strain: Not on file   Food Insecurity: Not on file   Transportation Needs: Not on file   Physical Activity: Not on file   Stress: Not on file   Social Connections: Not on file   Intimate Partner Violence: Not on file   Housing Stability: Not on file       Past Surgical History:   Procedure Laterality Date    HX ANKLE FRACTURE [place]      right ankle    [place] ANESTH,SURGERY OF SHOULDER      left        No family history on file. OB History    No obstetric history on file. REVIEW OF SYSTEMS:    Patient denies any recent fever, chills, nausea, vomiting, chest pain, or shortness of breath. Vitals:  Ht 5' 8.5\" (1.74 m)   Wt 180 lb (81.6 kg)   BMI 26.97 kg/m²    Body mass index is 26.97 kg/m². PHYSICAL EXAM:  General exam: Patient is awake, alert, and oriented x3. Well-appearing. No acute distress. Ambulates with a normal gait. Right shoulder: Neurovascular and sensory intact. There is tenderness to palpation at the anterior lateral shoulder. There is limited passive and active overhead range of motion. Pain is noted with impingement testing including Hernandez exam.  Pain and weakness 4/5 is noted with rotator cuff strength testing including resisted abduction and resisted external rotation. Normal stability.   There is some tenderness palpation at the [facility] joint and pain with crossarm exam.  There is pain with biceps load test including speeds exam        IMAGING:    XR Results (most recent):  Results from Appointment encounter on 06/27/22    XR SHOULDER RT AP/LAT MIN 2 V    Narrative  X-rays of the right shoulder 3 views done today show evidence of maintained glenohumeral joint space. No signs of acute fracture. Type II acromion      Results from Hospital Encounter encounter on 10/14/19    XR ANKLE RT MIN 3 V    Narrative  INTERPRETATION PROVIDED FOR COMPLIANCE ONLY AT NO CHARGE    4 spot fluoroscopic images of ankle surgery with side plate and screws in the  fibula and screws in the tibia. Ankle mortise appears intact. See operative  report for details. [facility] XR TECHNOLOGIST SERVICE    Narrative  Fluoroscopy was utilized. Impression  IMPRESSION:  FLUOROSCOPY WAS USED. Fluoro Dose:  0.346 mGy      vk         Orders Placed This Encounter    MRI SHOULDER RT WO CONT     Standing Status:   Future     Standing Expiration Date:   11/7/2023     Order Specific Question:   Arthrogram study     Answer:   No    methylPREDNISolone (MEDROL DOSEPACK) 4 mg tablet     Sig: Per dose pack instructions     Dispense:  1 Dose Pack     Refill:  0              An electronic signature was used to authenticate this note.   -- Teresa Marino,

## 2022-11-21 ENCOUNTER — OFFICE VISIT (OUTPATIENT)
Dept: ORTHOPEDIC SURGERY | Age: 57
End: 2022-11-21
Payer: COMMERCIAL

## 2022-11-21 VITALS — BODY MASS INDEX: 26.66 KG/M2 | WEIGHT: 180 LBS | HEIGHT: 69 IN

## 2022-11-21 DIAGNOSIS — M75.111 NONTRAUMATIC INCOMPLETE TEAR OF RIGHT ROTATOR CUFF: Primary | ICD-10-CM

## 2022-11-21 DIAGNOSIS — M19.011 ARTHRITIS OF RIGHT ACROMIOCLAVICULAR JOINT: ICD-10-CM

## 2022-11-21 DIAGNOSIS — M75.41 IMPINGEMENT SYNDROME OF RIGHT SHOULDER: ICD-10-CM

## 2022-11-21 PROCEDURE — 99214 OFFICE O/P EST MOD 30 MIN: CPT | Performed by: ORTHOPAEDIC SURGERY

## 2022-11-21 NOTE — PATIENT INSTRUCTIONS
What to expect after Shoulder Arthroscopy   Dr. Jacob Molina should not have ANYTHING to eat or drink after midnight the night before your surgery. This includes NO gum, mints, candy, lifesavers or lollipops! Please make sure to remove ALL jewelry. When you arrive at the hospital or surgery center, you will be checked in and given an IV. You will be offered a nerve block, which I do recommend. This will be done pre-operatively by the anesthesiologist. It is an injection around the base of your neck that numbs the nerves to your shoulder and arm. It lasts anywhere from 12 hours to 3 days. This will give you pain relief that hopefully lasts throughout your first night. When the nerve block wears off, you will likely be in pain. This can range from mild to severe. If you experience severe pain, this is still normal. Nothing is wrong. You would have woken up with worse pain if you did not have the nerve block! During first three days, the pain is usually the worst, and then gradually subsides after that. Swelling in the shoulder, elbow and hands is normal. You may also experience bruising in the arm   If you elected to have the nerve block, you may have some residual numbness that may last weeks. You will likely continue to have pain for several weeks or even months. Recovery from shoulder surgery takes several months. BE PATIENT! Wear the sling at all times (even sleeping!) except for showering and for the exercises listed below. All you need to do for the first five weeks is the following (four times per day): Remove your sling and:   Flex and extend your elbow with your elbow next to your side. You may be instructed to begin shoulder pendulum exercises after your first post-operative visit:  lean forward slightly, and with your arm hanging down and your hand pointing toward the floor, perform small circles with your arm and shoulder.      At your first follow-up visit, you will have your sutures removed and will be given a script for physical therapy. If you did NOT have a repair, your therapy will begin at that time and you will stop your sling use   If you DID have a repair, you will continue your sling until 5 weeks post-op. At 5 weeks post-op, you will begin physical therapy and discontinue your sling that day   You will be in therapy for about 6 weeks - 10 weeks.    Driving is dangerous while in a sling and it is recommended that you wait until you are out of your sling to begin. (unless otherwise directed by your physician)   Your restrictions will be as follows: (unless otherwise directed by your physician)   NO use of the arm/shoulder (except for writing / typing, fine manipulation) for the first two weeks   If you DID NOT have a repair:   NO lifting / carrying / pushing / pulling greater than 10 lbs for 6 weeks   No repetitive overhead activity for 6 weeks   Return to sports typically at 6 -8 weeks   If you DID have a repair   NO lifting / carrying / pushing / pulling greater than 10 lbs for 4 months   No repetitive overhead activity for 4 months   Return to sports: 4-6 months depending on the sport  If you have any questions or concerns throughout this process, call the [place] [address] office at [phone]

## 2022-11-22 NOTE — PROGRESS NOTES
Bridger Swift (: 1965) is a 62 y.o. female, established patient, here for evaluation of the following chief complaint(s):  Shoulder Pain (Right shoulder MRI results)       ASSESSMENT/PLAN:  Below is the assessment and plan developed based on review of pertinent history, physical exam, labs, studies, and medications. Findings were discussed with the patient today. We discussed right shoulder arthroscopy with rotator cuff repair, acromioplasty, and distal clavicle resection. We discussed possible capsular release and manipulation under anesthesia as well. Risks and benefits of surgical treatment were explained. We discussed risks of infection, blood loss, neurovascular injury, anesthesia risks, and risk secondary to patient comorbidities. Risk of continued shoulder pain/instability was explained. We discussed that there may be need for future surgical procedures. We discussed that implants may need to be used for this procedure. Patient understands the risks of this procedure and elects to schedule in the near future. 1. Nontraumatic incomplete tear of right rotator cuff  2. Impingement syndrome of right shoulder  3. Arthritis of right acromioclavicular joint      Return for surgery. SUBJECTIVE/OBJECTIVE:  Bridger Swift (: 1965) is a 62 y.o. female. She presents today for follow-up of her recent MRI. She notes her frustration with her continued sharp aching pain in the shoulder and with her limited range of motion. She has continued with ice and anti-inflammatories with minimal relief. She has tried corticosteroid injections and physical therapy over the last 3 months with minimal relief. No Known Allergies    Current Outpatient Medications   Medication Sig    methylPREDNISolone (MEDROL DOSEPACK) 4 mg tablet Per dose pack instructions    diclofenac EC (VOLTAREN) 75 mg EC tablet Take 1 Tablet by mouth two (2) times a day.     fluticasone propionate (FLONASE) 50 mcg/actuation nasal spray     levothyroxine (SYNTHROID) 50 mcg tablet Take 50 mcg by mouth Daily (before breakfast). estrogens, conjugated (CONJUGATED ESTROGENS PO) Take  by mouth daily. No current facility-administered medications for this visit. Social History     Socioeconomic History    Marital status:      Spouse name: Not on file    Number of children: Not on file    Years of education: Not on file    Highest education level: Not on file   Occupational History    Not on file   Tobacco Use    Smoking status: Never    Smokeless tobacco: Never   Vaping Use    Vaping Use: Never used   Substance and Sexual Activity    Alcohol use: Yes     Comment: rarely    Drug use: Never    Sexual activity: Not on file   Other Topics Concern    Not on file   Social History Narrative    Not on file     Social Determinants of Health     Financial Resource Strain: Not on file   Food Insecurity: Not on file   Transportation Needs: Not on file   Physical Activity: Not on file   Stress: Not on file   Social Connections: Not on file   Intimate Partner Violence: Not on file   Housing Stability: Not on file       Past Surgical History:   Procedure Laterality Date    HX ANKLE FRACTURE TX      right ankle    [place] ANESTH,SURGERY OF SHOULDER      left        History reviewed. No pertinent family history. OB History    No obstetric history on file. REVIEW OF SYSTEMS:    Patient denies any recent fever, chills, nausea, vomiting, chest pain, or shortness of breath. Vitals:  Ht 5' 8.5\" (1.74 m)   Wt 180 lb (81.6 kg)   BMI 26.97 kg/m²    Body mass index is 26.97 kg/m². PHYSICAL EXAM:  General exam: Patient is awake, alert, and oriented x3. Well-appearing. No acute distress. Ambulates with a normal gait. Right shoulder: Neurovascular and sensory intact. There is tenderness to palpation at the anterior lateral shoulder.   There is limited passive and active overhead range of motion 90 degrees of active forward flexion and abduction noted. .  Pain is noted with impingement testing including Hernandez exam.  Pain and weakness 4/5 is noted with rotator cuff strength testing including resisted abduction and resisted external rotation. Normal stability. There is some tenderness palpation at the [facility] joint and pain with crossarm exam.        IMAGING:  MRI Results (most recent):  Results from Appointment encounter on 11/10/22    MRI SHOULDER RT WO CONT    Narrative  EXAM: MRI SHOULDER RT WO CONT    INDICATION: Pain. COMPARISON: Radiographs 6/27/2022    TECHNIQUE: Axial proton density fat-saturated; oblique coronal T1, T2  fat-saturated, and proton density fat-saturated; and oblique sagittal T2  fat-saturated MRI of the right shoulder . CONTRAST: None. FINDINGS: A.C. joint: Mild appearance of osteoarthrosis. Anterior acromion  process type: 2, without substantial lateral downsloping or subacromial  spurring. Bone marrow: Within normal limits. No acute fracture, dislocation, or marrow  replacing process. Joint fluid: Small effusions of glenohumeral joint and subacromial subdeltoid  bursa. Trace fluid in [facility] joint. Rotator cuff tendons: Diffuse tendinopathy. Articular sided partial-thickness  tearing within the critical zone of the posterior supraspinatus and anterior  infraspinatus tendons. No tendon retraction demonstrated. No full-thickness  tendon tear demonstrated. Biceps tendon: Intact and located within the bicipital groove. Muscles: No edema or atrophy. Glenoid labrum: Intact. Glenohumeral joint capsule: within normal limits. Glenohumeral articular cartilage: Intact. No focal osteochondral lesion. Soft tissue mass: None. Impression  1. Rotator cuff tendinopathy with critical zone articular sided partial  thickness tearing within the posterior supraspinatus and anterior infraspinatus  tendons.  No full-thickness tendon tear, tendon retraction or muscle  atrophy-edema demonstrated. 2. Mild AC joint osteoarthrosis. Type II acromion. XR Results (most recent):  Results from Appointment encounter on 06/27/22    XR SHOULDER RT AP/LAT MIN 2 V    Narrative  X-rays of the right shoulder 3 views done today show evidence of maintained glenohumeral joint space. No signs of acute fracture. Type II acromion      Results from Hospital Encounter encounter on 10/14/19    XR ANKLE RT MIN 3 V    Narrative  INTERPRETATION PROVIDED FOR COMPLIANCE ONLY AT NO CHARGE    4 spot fluoroscopic images of ankle surgery with side plate and screws in the  fibula and screws in the tibia. Ankle mortise appears intact. See operative  report for details. [facility] XR TECHNOLOGIST SERVICE    Narrative  Fluoroscopy was utilized. Impression  IMPRESSION:  FLUOROSCOPY WAS USED. Fluoro Dose:  0.346 mGy      vk         No orders of the defined types were placed in this encounter. An electronic signature was used to authenticate this note.   -- Travis Soto, DO

## 2022-12-22 DIAGNOSIS — Z98.890 STATUS POST ARTHROSCOPY OF SHOULDER: Primary | ICD-10-CM

## 2022-12-22 RX ORDER — OXYCODONE HYDROCHLORIDE 5 MG/1
5 TABLET ORAL
Qty: 28 TABLET | Refills: 0 | Status: SHIPPED | OUTPATIENT
Start: 2022-12-22 | End: 2022-12-29

## 2023-01-05 ENCOUNTER — OFFICE VISIT (OUTPATIENT)
Dept: ORTHOPEDIC SURGERY | Age: 58
End: 2023-01-05
Payer: COMMERCIAL

## 2023-01-05 VITALS — BODY MASS INDEX: 26.66 KG/M2 | HEIGHT: 69 IN | WEIGHT: 180 LBS

## 2023-01-05 DIAGNOSIS — M75.01 ADHESIVE CAPSULITIS OF RIGHT SHOULDER: Primary | ICD-10-CM

## 2023-01-05 DIAGNOSIS — Z98.890 STATUS POST ARTHROSCOPY OF SHOULDER: ICD-10-CM

## 2023-01-05 PROCEDURE — 99024 POSTOP FOLLOW-UP VISIT: CPT | Performed by: ORTHOPAEDIC SURGERY

## 2023-01-05 NOTE — PROGRESS NOTES
Tyrese Galindo (: 1965) is a 62 y.o. female, established patient, here for evaluation of the following chief complaint(s):  Post OP Follow Up and Shoulder Pain       ASSESSMENT/PLAN:  Below is the assessment and plan developed based on review of pertinent history, physical exam, labs, studies, and medications. Findings were discussed with the patient today. We discussed starting physical therapy for passive range of motion to prevent recurrent stiffness in the right shoulder. She will also wear her sling intermittently. I will see her back in 1 month    1. Adhesive capsulitis of right shoulder  -     REFERRAL TO PHYSICAL THERAPY  2. Status post arthroscopy of shoulder  -     REFERRAL TO PHYSICAL THERAPY      Return in about 4 weeks (around 2023). SUBJECTIVE/OBJECTIVE:  Tyrese Galindo (: 1965) is a 62 y.o. female. She presents status post right shoulder manipulation under anesthesia and small rotator cuff repair. Overall doing well         No Known Allergies    Current Outpatient Medications   Medication Sig    methylPREDNISolone (MEDROL DOSEPACK) 4 mg tablet Per dose pack instructions (Patient not taking: Reported on 2023)    diclofenac EC (VOLTAREN) 75 mg EC tablet Take 1 Tablet by mouth two (2) times a day. fluticasone propionate (FLONASE) 50 mcg/actuation nasal spray     levothyroxine (SYNTHROID) 50 mcg tablet Take 50 mcg by mouth Daily (before breakfast). estrogens, conjugated (CONJUGATED ESTROGENS PO) Take  by mouth daily. No current facility-administered medications for this visit.        Social History     Socioeconomic History    Marital status:      Spouse name: Not on file    Number of children: Not on file    Years of education: Not on file    Highest education level: Not on file   Occupational History    Not on file   Tobacco Use    Smoking status: Never    Smokeless tobacco: Never   Vaping Use    Vaping Use: Never used   Substance and Sexual Activity    Alcohol use: Yes     Comment: rarely    Drug use: Never    Sexual activity: Not on file   Other Topics Concern    Not on file   Social History Narrative    Not on file     Social Determinants of Health     Financial Resource Strain: Not on file   Food Insecurity: Not on file   Transportation Needs: Not on file   Physical Activity: Not on file   Stress: Not on file   Social Connections: Not on file   Intimate Partner Violence: Not on file   Housing Stability: Not on file       Past Surgical History:   Procedure Laterality Date    HX ANKLE FRACTURE TX      right ankle    ND ANES NRV [facility]      left        No family history on file. OB History    No obstetric history on file. REVIEW OF SYSTEMS:  ROS    Positive for: Musculoskeletal  Last edited by Brian Wells on 1/5/2023  8:44 AM.        Patient denies any recent fever, chills, nausea, vomiting, chest pain, or shortness of breath. Vitals:  Ht 5' 8.5\" (1.74 m)   Wt 180 lb (81.6 kg)   BMI 26.97 kg/m²    Body mass index is 26.97 kg/m². PHYSICAL EXAM:  General exam: Patient is awake, alert, and oriented x3. Well-appearing. No acute distress. Ambulates with a normal gait. Heart/Lungs:  no respiratory distress, palpable pulses    Right shoulder: Neurovascular and sensory intact. Incisions well-healed with no signs of erythema or drainage. Mild swelling and ecchymosis. Decreased range of motion. Normal stability. IMAGING:    XR Results (most recent):  Results from Appointment encounter on 06/27/22    XR SHOULDER RT AP/LAT MIN 2 V    Narrative  X-rays of the right shoulder 3 views done today show evidence of maintained glenohumeral joint space. No signs of acute fracture.   Type II acromion      Results from Hospital Encounter encounter on 10/14/19    XR ANKLE RT MIN 3 V    Narrative  INTERPRETATION PROVIDED FOR COMPLIANCE ONLY AT NO CHARGE    4 spot fluoroscopic images of ankle surgery with side plate and screws in the  fibula and screws in the tibia. Ankle mortise appears intact. See operative  report for details. [facility] XR TECHNOLOGIST SERVICE    Narrative  Fluoroscopy was utilized. Impression  IMPRESSION:  FLUOROSCOPY WAS USED. Fluoro Dose:  0.346 mGy      vk         Orders Placed This Encounter    REFERRAL TO PHYSICAL THERAPY     Referral Priority:   Routine     Referral Type:   PT/OT/ST     Referral Reason:   Specialty Services Required     Number of Visits Requested:   1              An electronic signature was used to authenticate this note.   -- Denese Lesches, DO

## 2023-02-09 ENCOUNTER — OFFICE VISIT (OUTPATIENT)
Dept: ORTHOPEDIC SURGERY | Age: 58
End: 2023-02-09
Payer: COMMERCIAL

## 2023-02-09 VITALS — HEIGHT: 69 IN | BODY MASS INDEX: 26.66 KG/M2 | WEIGHT: 180 LBS

## 2023-02-09 DIAGNOSIS — Z98.890 STATUS POST ARTHROSCOPY OF SHOULDER: Primary | ICD-10-CM

## 2023-02-09 DIAGNOSIS — M75.01 ADHESIVE CAPSULITIS OF RIGHT SHOULDER: ICD-10-CM

## 2023-02-09 PROCEDURE — 99024 POSTOP FOLLOW-UP VISIT: CPT | Performed by: ORTHOPAEDIC SURGERY

## 2023-02-09 RX ORDER — CYCLOBENZAPRINE HCL 10 MG
10 TABLET ORAL
Qty: 30 TABLET | Refills: 0 | Status: SHIPPED | OUTPATIENT
Start: 2023-02-09

## 2023-02-09 RX ORDER — DICLOFENAC SODIUM 75 MG/1
75 TABLET, DELAYED RELEASE ORAL 2 TIMES DAILY
Qty: 60 TABLET | Refills: 3 | Status: SHIPPED | OUTPATIENT
Start: 2023-02-09

## 2023-02-09 RX ORDER — METHYLPREDNISOLONE 4 MG/1
TABLET ORAL
Qty: 1 DOSE PACK | Refills: 0 | Status: SHIPPED | OUTPATIENT
Start: 2023-02-09

## 2023-02-09 NOTE — PROGRESS NOTES
Stew Lainez (: 1965) is a 62 y.o. female, established patient, here for evaluation of the following chief complaint(s):  Post OP Follow Up and Shoulder Pain       ASSESSMENT/PLAN:  Below is the assessment and plan developed based on review of pertinent history, physical exam, labs, studies, and medications. She is progressing with therapy although it has been slow. She will continue with some passive range of motion exercises and over the next few weeks we will increase her active range of motion. I will plan to see her back in 6 to 8 weeks to assess her progress. 1. Status post arthroscopy of shoulder  2. Adhesive capsulitis of right shoulder      No follow-ups on file. SUBJECTIVE/OBJECTIVE:  Stew Lainez (: 1965) is a 62 y.o. female. She presents today for follow-up of her recent right shoulder arthroscopy, manipulation under anesthesia, capsular release, small rotator cuff repair, and acromioplasty. She notes her frustration with her stiffness in her shoulder. She is working with physical therapy        No Known Allergies    Current Outpatient Medications   Medication Sig    methylPREDNISolone (Medrol, Gilberto,) 4 mg tablet Use as directed    diclofenac EC (VOLTAREN) 75 mg EC tablet Take 1 Tablet by mouth two (2) times a day. cyclobenzaprine (FLEXERIL) 10 mg tablet Take 1 Tablet by mouth nightly as needed for Muscle Spasm(s). methylPREDNISolone (MEDROL DOSEPACK) 4 mg tablet Per dose pack instructions (Patient not taking: Reported on 2023)    diclofenac EC (VOLTAREN) 75 mg EC tablet Take 1 Tablet by mouth two (2) times a day. fluticasone propionate (FLONASE) 50 mcg/actuation nasal spray     levothyroxine (SYNTHROID) 50 mcg tablet Take 50 mcg by mouth Daily (before breakfast). estrogens, conjugated (CONJUGATED ESTROGENS PO) Take  by mouth daily. No current facility-administered medications for this visit.        Social History     Socioeconomic History Marital status:      Spouse name: Not on file    Number of children: Not on file    Years of education: Not on file    Highest education level: Not on file   Occupational History    Not on file   Tobacco Use    Smoking status: Never    Smokeless tobacco: Never   Vaping Use    Vaping Use: Never used   Substance and Sexual Activity    Alcohol use: Yes     Comment: rarely    Drug use: Never    Sexual activity: Not on file   Other Topics Concern    Not on file   Social History Narrative    Not on file     Social Determinants of Health     Financial Resource Strain: Not on file   Food Insecurity: Not on file   Transportation Needs: Not on file   Physical Activity: Not on file   Stress: Not on file   Social Connections: Not on file   Intimate Partner Violence: Not on file   Housing Stability: Not on file       Past Surgical History:   Procedure Laterality Date    HX ANKLE FRACTURE TX      right ankle    [place] ANES NRV [facility] [address] [facility]      left        No family history on file. OB History    No obstetric history on file. REVIEW OF SYSTEMS:  ROS    Positive for: Musculoskeletal  Last edited by Rayna Chicas on 2/9/2023  8:09 AM.        Patient denies any recent fever, chills, nausea, vomiting, chest pain, or shortness of breath. Vitals:  Ht 5' 8.5\" (1.74 m)   Wt 180 lb (81.6 kg)   BMI 26.97 kg/m²    Body mass index is 26.97 kg/m². PHYSICAL EXAM:  General exam: Patient is awake, alert, and oriented x3. Well-appearing. No acute distress. Ambulates with a normal gait. Heart/Lungs:  no respiratory distress, palpable pulses    Right shoulder: Passive forward flexion 130 degrees today. Abduction 110 degrees.   Weakness noted with resisted rotator cuff testing    IMAGING:    XR Results (most recent):  Results from Appointment encounter on 06/27/22    XR SHOULDER RT AP/LAT MIN 2 V    Narrative  X-rays of the right shoulder 3 views done today show evidence of maintained glenohumeral joint space. No signs of acute fracture. Type II acromion      Results from Hospital Encounter encounter on 10/14/19    XR ANKLE RT MIN 3 V    Narrative  INTERPRETATION PROVIDED FOR COMPLIANCE ONLY AT NO CHARGE    4 spot fluoroscopic images of ankle surgery with side plate and screws in the  fibula and screws in the tibia. Ankle mortise appears intact. See operative  report for details. [facility] TECHNOLOGIST SERVICE    Narrative  Fluoroscopy was utilized. Impression  IMPRESSION:  FLUOROSCOPY WAS USED. Fluoro Dose:  0.346 mGy      vk         Orders Placed This Encounter    methylPREDNISolone (Medrol, Gilberto,) 4 mg tablet     Sig: Use as directed     Dispense:  1 Dose Pack     Refill:  0    diclofenac EC (VOLTAREN) 75 mg EC tablet     Sig: Take 1 Tablet by mouth two (2) times a day. Dispense:  60 Tablet     Refill:  3    cyclobenzaprine (FLEXERIL) 10 mg tablet     Sig: Take 1 Tablet by mouth nightly as needed for Muscle Spasm(s). Dispense:  30 Tablet     Refill:  0              An electronic signature was used to authenticate this note.   -- Paralee Ripper, DO

## 2023-03-30 ENCOUNTER — OFFICE VISIT (OUTPATIENT)
Dept: ORTHOPEDIC SURGERY | Age: 58
End: 2023-03-30
Payer: COMMERCIAL

## 2023-03-30 VITALS — WEIGHT: 180 LBS | HEIGHT: 69 IN | BODY MASS INDEX: 26.66 KG/M2

## 2023-03-30 DIAGNOSIS — M75.01 ADHESIVE CAPSULITIS OF RIGHT SHOULDER: Primary | ICD-10-CM

## 2023-03-30 PROCEDURE — 99212 OFFICE O/P EST SF 10 MIN: CPT | Performed by: ORTHOPAEDIC SURGERY

## 2023-03-30 NOTE — PROGRESS NOTES
Shashi Green (: 1965) is a 62 y.o. female, established patient, here for evaluation of the following chief complaint(s):  Follow-up ( from 2022 surgery)       ASSESSMENT/PLAN:  Below is the assessment and plan developed based on review of pertinent history, physical exam, labs, studies, and medications. She continues to struggle with range of motion improvements in physical therapy. She had similar issues with her previous left shoulder surgery. She responded very well to a manipulation under anesthesia with her left shoulder. I believe that she would do well with a manipulation under anesthesia and corticosteroid injection of the right shoulder. We discussed risks and benefits of this procedure including continued stiffness, neurovascular injury, fracture, and risk secondary to medical comorbidities. She will be scheduling her manipulation with corticosteroid injection in the near future    1. Adhesive capsulitis of right shoulder    Return for surgery. SUBJECTIVE/OBJECTIVE:  Shashi Green (: 1965) is a 62 y.o. female. She presents today with continued stiffness in the right shoulder. She is over 3 months status post right shoulder arthroscopy with lysis of adhesions, acromioplasty, and distal clavicle resection. She continues with significant stiffness and has had difficulty with daily activities because of limitations. She has done 3 months of physical therapy and continues with limited range of motion of the shoulder. She cannot lift the shoulder beyond 90 degrees. No Known Allergies    Current Outpatient Medications   Medication Sig    methylPREDNISolone (Medrol, Gilberto,) 4 mg tablet Use as directed    diclofenac EC (VOLTAREN) 75 mg EC tablet Take 1 Tablet by mouth two (2) times a day. cyclobenzaprine (FLEXERIL) 10 mg tablet Take 1 Tablet by mouth nightly as needed for Muscle Spasm(s).     fluticasone propionate (FLONASE) 50 mcg/actuation nasal spray levothyroxine (SYNTHROID) 50 mcg tablet Take 50 mcg by mouth Daily (before breakfast). estrogens, conjugated (CONJUGATED ESTROGENS PO) Take  by mouth daily. No current facility-administered medications for this visit. Social History     Socioeconomic History    Marital status:      Spouse name: Not on file    Number of children: Not on file    Years of education: Not on file    Highest education level: Not on file   Occupational History    Not on file   Tobacco Use    Smoking status: Never    Smokeless tobacco: Never   Vaping Use    Vaping Use: Never used   Substance and Sexual Activity    Alcohol use: Yes     Comment: rarely    Drug use: Never    Sexual activity: Not on file   Other Topics Concern    Not on file   Social History Narrative    Not on file     Social Determinants of Health     Financial Resource Strain: Not on file   Food Insecurity: Not on file   Transportation Needs: Not on file   Physical Activity: Not on file   Stress: Not on file   Social Connections: Not on file   Intimate Partner Violence: Not on file   Housing Stability: Not on file       Past Surgical History:   Procedure Laterality Date    HX ANKLE FRACTURE TX      right ankle    [place] ANES [facility] [address] [facility]      left        History reviewed. No pertinent family history. OB History    No obstetric history on file. REVIEW OF SYSTEMS:    Patient denies any recent fever, chills, nausea, vomiting, chest pain, or shortness of breath. Vitals:  Ht 5' 8.5\" (1.74 m)   Wt 180 lb (81.6 kg)   BMI 26.97 kg/m²    Body mass index is 26.97 kg/m². PHYSICAL EXAM:  General exam: Patient is awake, alert, and oriented x3. Well-appearing. No acute distress. Ambulates with a normal gait. Heart/Lungs:  no respiratory distress, palpable pulses    Right shoulder: Neurovascular and sensory intact. There is tenderness palpation at the anterior lateral shoulder.   Decreased active and passive range of motion is noted in all planes with 90 degrees of forward flexion and abduction. 30 degrees of internal and external rotation. Normal stability. Rotator cuff strength testing was painful today. There is pain with impingement testing including Hernandez exam.        IMAGING:    XR Results (most recent):  Results from Appointment encounter on 06/27/22    XR SHOULDER RT AP/LAT MIN 2 V    Narrative  X-rays of the right shoulder 3 views done today show evidence of maintained glenohumeral joint space. No signs of acute fracture. Type II acromion      Results from Hospital Encounter encounter on 10/14/19    XR ANKLE RT MIN 3 V    Narrative  INTERPRETATION PROVIDED FOR COMPLIANCE ONLY AT NO CHARGE    4 spot fluoroscopic images of ankle surgery with side plate and screws in the  fibula and screws in the tibia. Ankle mortise appears intact. See operative  report for details. [facility] XR TECHNOLOGIST SERVICE    Narrative  Fluoroscopy was utilized. Impression  IMPRESSION:  FLUOROSCOPY WAS USED. Fluoro Dose:  0.346 mGy      vk         No orders of the defined types were placed in this encounter. An electronic signature was used to authenticate this note.   -- Annalise Box, DO

## 2023-04-30 RX ORDER — METHYLPREDNISOLONE 4 MG/1
TABLET ORAL
COMMUNITY
Start: 2023-02-09

## (undated) DEVICE — BANDAGE COMPR M W6INXL10YD WHT BGE VELC E MTRX HK AND LOOP

## (undated) DEVICE — PADDING CST CRMPD 3INX4YD NS --

## (undated) DEVICE — STRAP,POSITIONING,KNEE/BODY,FOAM,4X60": Brand: MEDLINE

## (undated) DEVICE — SUTURE VCRL SZ 0 L27IN ABSRB UD L36MM CT-1 1/2 CIR J260H

## (undated) DEVICE — SUT ETHLN 3-0 18IN PS1 BLK --

## (undated) DEVICE — SURGICAL PROCEDURE PACK BASIN MAJ SET CUST NO CAUT

## (undated) DEVICE — TUBING SUCT 10FR MAL ALUM SHFT FN CAP VENT UNIV CONN W/ OBT

## (undated) DEVICE — SUTURE VCRL SZ 2-0 L27IN ABSRB UD L26MM SH 1/2 CIR J417H

## (undated) DEVICE — ZIMMER® STERILE DISPOSABLE TOURNIQUET CUFF WITH PLC, DUAL PORT, SINGLE BLADDER, 34 IN. (86 CM)

## (undated) DEVICE — BIT DRL L110MM DIA3.5MM QUIK CPL W/O STP REUSE

## (undated) DEVICE — DRAPE XR C ARM 41X74IN LF --

## (undated) DEVICE — PREP SKN PREVAIL 40ML APPL --

## (undated) DEVICE — PACK,BASIC,SIRUS,V: Brand: MEDLINE

## (undated) DEVICE — PAD,ABDOMINAL,5"X9",ST,LF,25/BX: Brand: MEDLINE INDUSTRIES, INC.

## (undated) DEVICE — STRIP,CLOSURE,WOUND,MEDI-STRIP,1/2X4: Brand: MEDLINE

## (undated) DEVICE — PADDING CAST 4 INX5 YD STRL

## (undated) DEVICE — ASTOUND STANDARD SURGICAL GOWN, XXL: Brand: CONVERTORS

## (undated) DEVICE — SOLUTION IV 1000ML 0.9% SOD CHL

## (undated) DEVICE — DRESSING,GAUZE,XEROFORM,CURAD,1"X8",ST: Brand: CURAD

## (undated) DEVICE — SPONGE GZ W4XL4IN COT 12 PLY TYP VII WVN C FLD DSGN

## (undated) DEVICE — 2.5MM DRILL BIT/QC/GOLD/110MM

## (undated) DEVICE — PENCIL ES L3M BTTN SWCH S STL HEX LOK BLDE ELECTRD HOLSTER

## (undated) DEVICE — REM POLYHESIVE ADULT PATIENT RETURN ELECTRODE: Brand: VALLEYLAB

## (undated) DEVICE — INFECTION CONTROL KIT SYS

## (undated) DEVICE — DRAPE,REIN 53X77,STERILE: Brand: MEDLINE

## (undated) DEVICE — DRAPE,EXTREMITY,89X128,STERILE: Brand: MEDLINE